# Patient Record
Sex: FEMALE | Race: WHITE | Employment: OTHER | URBAN - METROPOLITAN AREA
[De-identification: names, ages, dates, MRNs, and addresses within clinical notes are randomized per-mention and may not be internally consistent; named-entity substitution may affect disease eponyms.]

---

## 2017-12-20 ENCOUNTER — HOSPITAL ENCOUNTER (OUTPATIENT)
Dept: LAB | Age: 27
Discharge: HOME | End: 2017-12-20

## 2017-12-22 LAB — SPECIMEN SENT TO LAB,INSX: NORMAL

## 2019-04-10 ENCOUNTER — OFFICE VISIT (OUTPATIENT)
Dept: PRIMARY CARE CLINIC | Age: 29
End: 2019-04-10

## 2019-04-10 VITALS
WEIGHT: 165.2 LBS | HEIGHT: 65 IN | OXYGEN SATURATION: 97 % | RESPIRATION RATE: 16 BRPM | TEMPERATURE: 98 F | SYSTOLIC BLOOD PRESSURE: 120 MMHG | BODY MASS INDEX: 27.52 KG/M2 | HEART RATE: 92 BPM | DIASTOLIC BLOOD PRESSURE: 82 MMHG

## 2019-04-10 DIAGNOSIS — Z00.00 ROUTINE PHYSICAL EXAMINATION: Primary | ICD-10-CM

## 2019-04-10 DIAGNOSIS — R79.89 LOW VITAMIN D LEVEL: ICD-10-CM

## 2019-04-10 DIAGNOSIS — Z83.3 FAMILY HISTORY OF DIABETES MELLITUS: ICD-10-CM

## 2019-04-10 NOTE — PROGRESS NOTES
Chief Complaint   Patient presents with   [address]     [facility] and she has a yeast infection and place on vaginia and given doxy and states that she has switched to old birth controal

## 2019-04-10 NOTE — PROGRESS NOTES
[facility]   Dea [address], [address] [facility]  P: [phone]  F: [phone]      Chief Complaint   Patient presents with   [address]     [facility] and she has a yeast infection and place on vaginia and given doxy and states that she has switched to old birth controal        Steven Soares is a 33 y.o. female who presents to clinic for [address] ([facility] and she has a yeast infection and place on vaginia and given doxy and states that she has switched to old birth controal ). HPI:    The patient presents to [facility]- previously with [facility]. Overall healthy, no complaints besides stated issues below. Takes daily Zyrtec and OCP. She went to [facility] earlier this week-  irritation to labia for several weeks. She was placed on doxycycline course and given ketoconozole cream for yeast under her breasts    Chronic dermatitis to face- she has tried steroid creams, Cerave face wash, Dove soaps with some relief. Taking a daily Zyrtec as well. Preventive health:  UTD. Normal Pap last 2 yrs ago. She has an OBGYN set up for appointment in 6 months. Unsure of last tetanus. She is , is a  of pets mostly. No alcohol or smoking. Does use CBD oil for sleep and anxiety. There are no active problems to display for this patient. History reviewed. No pertinent past medical history.   Past Surgical History:   Procedure Laterality Date    HX CHOLECYSTECTOMY      2009     Social History     Socioeconomic History    Marital status: SINGLE     Spouse name: Not on file    Number of children: Not on file    Years of education: Not on file    Highest education level: Not on file   Occupational History    Not on file   Social Needs    Financial resource strain: Not on file    Food insecurity:     Worry: Not on file     Inability: Not on file   [employer] needs:     Medical: Not on file     Non-medical: Not on file   Tobacco Use    Smoking status: Never Smoker    Smokeless tobacco: Never Used   Substance and Sexual Activity    Alcohol use: Not Currently    Drug use: Not Currently    Sexual activity: Yes     Partners: Male   Lifestyle    Physical activity:     Days per week: Not on file     Minutes per session: Not on file    Stress: Not on file   Relationships    Social connections:     Talks on phone: Not on file     Gets together: Not on file     Attends Holiness service: Not on file     Active member of club or organization: Not on file     Attends meetings of clubs or organizations: Not on file     Relationship status: Not on file    Intimate partner violence:     Fear of current or ex partner: Not on file     Emotionally abused: Not on file     Physically abused: Not on file     Forced sexual activity: Not on file   Other Topics Concern    Not on file   Social History Narrative    Not on file     History reviewed. No pertinent family history. Allergies   Allergen Reactions    Dilaudid [Hydromorphone] Hives     And hallucinations. The medications were reviewed and updated in the medical record. The past medical history, past surgical history, and family history were reviewed and updated in the medical record. REVIEW OF SYSTEMS   Review of Systems   Constitutional: Negative for malaise/fatigue and weight loss. HENT: Negative for congestion. Eyes: Negative for blurred vision and pain. Respiratory: Negative for cough and shortness of breath. Cardiovascular: Negative for chest pain and palpitations. Gastrointestinal: Negative for abdominal pain and heartburn. Genitourinary: Negative for frequency and urgency. Musculoskeletal: Negative for joint pain and myalgias. Neurological: Negative for dizziness, tingling, sensory change, weakness and headaches.    Psychiatric/Behavioral: Negative for depression, memory loss and substance abuse. The patient does not have insomnia. PHYSICAL EXAM     Visit Vitals  /82 (BP 1 Location: Left arm, BP Patient Position: Sitting)   Pulse 92   Temp 98 °F (36.7 °C) (Oral)   Resp 16   Ht 5' 5\" (1.651 m)   Wt 165 lb 3.2 oz (74.9 kg)   SpO2 97%   BMI 27.49 kg/m²       Physical Exam   Constitutional: She is oriented to person, place, and time and well-developed, well-nourished, and in no distress. Vital signs are normal.   BMI 27   HENT:   Head: Normocephalic and atraumatic. Right Ear: Hearing, tympanic membrane, external ear and ear canal normal.   Left Ear: Hearing, tympanic membrane, external ear and ear canal normal.   Nose: Nose normal.   Mouth/Throat: Uvula is midline, oropharynx is clear and moist and mucous membranes are normal.   Eyes: Right eye visual fields normal and left eye visual fields normal. Pupils are equal, round, and reactive to light. Conjunctivae, EOM and lids are normal.   Fundoscopic exam:       The right eye shows no AV nicking. The left eye shows no AV nicking.   + Glasses   Neck: Trachea normal. No thyromegaly present. Cardiovascular: S1 normal, S2 normal and normal heart sounds. Exam reveals no gallop and no friction rub. No murmur heard. Pulmonary/Chest: Effort normal and breath sounds normal.   Abdominal: Soft. Normal appearance. There is no hepatosplenomegaly. There is no tenderness. Musculoskeletal: Normal range of motion. Neurological: She is alert and oriented to person, place, and time. She has normal reflexes. Gait normal.   Reflex Scores:       Patellar reflexes are 2+ on the right side and 2+ on the left side. Skin: Skin is warm, dry and intact. Several tattoos. Reports dermatitis to face- nothing found on exam.    Psychiatric: Mood, affect and judgment normal.     ASSESSMENT/ PLAN   Diagnoses and all orders for this visit:    1.  Routine physical examination  -     CBC W/O DIFF  -     METABOLIC PANEL, COMPREHENSIVE  -     LIPID PANEL  -     VITAMIN D, 25 HYDROXY  -     TSH 3RD GENERATION    2. Low vitamin D level  -     VITAMIN D, 25 HYDROXY    3. Family history of diabetes mellitus  -     HEMOGLOBIN A1C WITH EAG      Please sign release of records from [facility]. Reviewed age appropriate safety and screening guidelines, as well as maintaining a healthy diet and exercise 150 minutes or more weekly. Wear SPF 15 or greater sunscreen and please wear seatbelt. Discussed safe sex practices. Patient with no further questions today. Disclaimer:  Advised patient to call back or return to office if symptoms worsen/change/persist.  Discussed expected course/resolution/complications of diagnosis in detail with patient.     Medication risks/benefits/alternatives discussed with patient. Patient was given an after visit summary which includes diagnoses, current medications, & vitals.      Discussed patient instructions and advised to read to all patient instructions regarding care.      Patient expressed understanding with the diagnosis and plan. This note will not be viewable in [address] Carolyn Hastings, DIVYA  4/10/2019        (This document has been electronically signed)  Discussed the patient's BMI with her. The BMI follow up plan is as follows:     BMI discussed with patient. Discussed lifestyle changes, daily physical activity, and advised 150 minutes of exercise weekly. Discussed healthy diet choices and limiting fried, fatty foods, fast foods, processed foods, sugar-sweetened beverages/soda, and added sugars. Increase fruits, vegetables, low-fat dairy products, lean proteins, and whole grains. An After Visit Summary was printed and given to the patient.

## 2019-04-10 NOTE — PATIENT INSTRUCTIONS
Body Mass Index: Care Instructions  Your Care Instructions    Body mass index (BMI) can help you see if your weight is raising your risk for health problems. It uses a formula to compare how much you weigh with how tall you are. · A BMI lower than 18.5 is considered underweight. · A BMI between 18.5 and 24.9 is considered healthy. · A BMI between 25 and 29.9 is considered overweight. A BMI of 30 or higher is considered obese. If your BMI is in the normal range, it means that you have a lower risk for weight-related health problems. If your BMI is in the overweight or obese range, you may be at increased risk for weight-related health problems, such as high blood pressure, heart disease, stroke, arthritis or joint pain, and diabetes. If your BMI is in the underweight range, you may be at increased risk for health problems such as fatigue, lower protection (immunity) against illness, muscle loss, bone loss, hair loss, and hormone problems. BMI is just one measure of your risk for weight-related health problems. You may be at higher risk for health problems if you are not active, you eat an unhealthy diet, or you drink too much alcohol or use tobacco products. Follow-up care is a key part of your treatment and safety. Be sure to make and go to all appointments, and call your doctor if you are having problems. It's also a good idea to know your test results and keep a list of the medicines you take. How can you care for yourself at home? · Practice healthy eating habits. This includes eating plenty of fruits, vegetables, whole grains, lean protein, and low-fat dairy. · If your doctor recommends it, get more exercise. Walking is a good choice. Bit by bit, increase the amount you walk every day. Try for at least 30 minutes on most days of the week. · Do not smoke. Smoking can increase your risk for health problems. If you need help quitting, talk to your doctor about stop-smoking programs and medicines. These can increase your chances of quitting for good. · Limit alcohol to 2 drinks a day for men and 1 drink a day for women. Too much alcohol can cause health problems. If you have a BMI higher than 25  · Your doctor may do other tests to check your risk for weight-related health problems. This may include measuring the distance around your waist. A waist measurement of more than 40 inches in men or 35 inches in women can increase the risk of weight-related health problems. · Talk with your doctor about steps you can take to stay healthy or improve your health. You may need to make lifestyle changes to lose weight and stay healthy, such as changing your diet and getting regular exercise. If you have a BMI lower than 18.5  · Your doctor may do other tests to check your risk for health problems. · Talk with your doctor about steps you can take to stay healthy or improve your health. You may need to make lifestyle changes to gain or maintain weight and stay healthy, such as getting more healthy foods in your diet and doing exercises to build muscle. Where can you learn more? Go to http://pat-sebastián.info/. Enter S176 in the search box to learn more about \"Body Mass Index: Care Instructions. \"  Current as of: October 13, 2016  Content Version: 11.4  © 6690-2978 [employer]. Care instructions adapted under license by [employer] (which disclaims liability or warranty for this information). If you have questions about a medical condition or this instruction, always ask your healthcare professional. [address] any warranty or liability for your use of this information.

## 2019-04-11 LAB
25(OH)D3+25(OH)D2 SERPL-MCNC: 27.1 NG/ML (ref 30–100)
ALBUMIN SERPL-MCNC: 4.2 G/DL (ref 3.5–5.5)
ALBUMIN/GLOB SERPL: 1.6 {RATIO} (ref 1.2–2.2)
ALP SERPL-CCNC: 69 IU/L (ref 39–117)
ALT SERPL-CCNC: 17 IU/L (ref 0–32)
AST SERPL-CCNC: 17 IU/L (ref 0–40)
BILIRUB SERPL-MCNC: 0.6 MG/DL (ref 0–1.2)
BUN SERPL-MCNC: 12 MG/DL (ref 6–20)
BUN/CREAT SERPL: 14 (ref 9–23)
CALCIUM SERPL-MCNC: 9.4 MG/DL (ref 8.7–10.2)
CHLORIDE SERPL-SCNC: 107 MMOL/L (ref 96–106)
CHOLEST SERPL-MCNC: 184 MG/DL (ref 100–199)
CO2 SERPL-SCNC: 18 MMOL/L (ref 20–29)
CREAT SERPL-MCNC: 0.86 MG/DL (ref 0.57–1)
ERYTHROCYTE [DISTWIDTH] IN BLOOD BY AUTOMATED COUNT: 13 % (ref 12.3–15.4)
EST. AVERAGE GLUCOSE BLD GHB EST-MCNC: 105 MG/DL
GLOBULIN SER CALC-MCNC: 2.7 G/DL (ref 1.5–4.5)
GLUCOSE SERPL-MCNC: 88 MG/DL (ref 65–99)
HBA1C MFR BLD: 5.3 % (ref 4.8–5.6)
HCT VFR BLD AUTO: 40.5 % (ref 34–46.6)
HDLC SERPL-MCNC: 46 MG/DL
HGB BLD-MCNC: 13.3 G/DL (ref 11.1–15.9)
LDLC SERPL CALC-MCNC: 108 MG/DL (ref 0–99)
MCH RBC QN AUTO: 29.2 PG (ref 26.6–33)
MCHC RBC AUTO-ENTMCNC: 32.8 G/DL (ref 31.5–35.7)
MCV RBC AUTO: 89 FL (ref 79–97)
PLATELET # BLD AUTO: 208 X10E3/UL (ref 150–379)
POTASSIUM SERPL-SCNC: 4.3 MMOL/L (ref 3.5–5.2)
PROT SERPL-MCNC: 6.9 G/DL (ref 6–8.5)
RBC # BLD AUTO: 4.55 X10E6/UL (ref 3.77–5.28)
SODIUM SERPL-SCNC: 140 MMOL/L (ref 134–144)
TRIGL SERPL-MCNC: 151 MG/DL (ref 0–149)
TSH SERPL DL<=0.005 MIU/L-ACNC: 2.64 UIU/ML (ref 0.45–4.5)
VLDLC SERPL CALC-MCNC: 30 MG/DL (ref 5–40)
WBC # BLD AUTO: 5 X10E3/UL (ref 3.4–10.8)

## 2019-04-12 NOTE — PROGRESS NOTES
Please call- let her know triglyceride and LDL cholesterol are slightly up and Vit D is low. Have her work on exercise and diet for cholesterol and for low vitamin D,  I recommend over the counter 1000IU Vitamin D daily. I do recommend a calcium supplement of 47470ua a day and 15 minutes of sunlight daily for optimal bone health and decreased fracture risk.  No need for follow-up just annual exam.

## 2019-05-20 ENCOUNTER — OFFICE VISIT (OUTPATIENT)
Dept: PRIMARY CARE CLINIC | Age: 29
End: 2019-05-20

## 2019-05-20 VITALS
WEIGHT: 161.2 LBS | BODY MASS INDEX: 26.86 KG/M2 | SYSTOLIC BLOOD PRESSURE: 138 MMHG | OXYGEN SATURATION: 99 % | TEMPERATURE: 98.1 F | HEIGHT: 65 IN | RESPIRATION RATE: 16 BRPM | DIASTOLIC BLOOD PRESSURE: 92 MMHG | HEART RATE: 86 BPM

## 2019-05-20 DIAGNOSIS — R21 MALAR RASH: Primary | ICD-10-CM

## 2019-05-20 NOTE — PROGRESS NOTES
Chief Complaint   Patient presents with   Philip Tip has multiple symptoms and would like to be checked for lupus. jointpain, mouth sores, cough and fatigue and butterfly rash on face.

## 2019-05-20 NOTE — PROGRESS NOTES
[facility]   [address], [address] [facility]  P: [phone]  F: [phone]      Chief Complaint   Patient presents with   Wendy Cedeño has multiple symptoms and would like to be checked for lupus. jointpain, mouth sores, cough and fatigue and butterfly rash on face. Vince Crane is a 30 y.o. female who presents to clinic for Other (ninot has multiple symptoms and would like to be checked for lupus. jointpain, mouth sores, cough and fatigue and butterfly rash on face. ). HPI:    Maria G is a 81-year-old female who presents today with request to check lab work for lupus. She is experiencing episodic malar rash, which occurs at random, but is not currently bothering her. She also went to the dentist recently and was told she may have oral ulcers, but went to a second dentist who denied seeing anything abnormal on dental exam.  She endorses soreness to multiple joints including her bilateral knees and elbows, and her TMJ. She is unsure of her father's medical history as she is not in touch with him but is wondering if he has a family history for lupus. There are no active problems to display for this patient.      Past Medical History:   Diagnosis Date    Asthma     Ovarian cyst      Past Surgical History:   Procedure Laterality Date    HX CHOLECYSTECTOMY      HX CHOLECYSTECTOMY      2009     Social History     Socioeconomic History    Marital status: SINGLE     Spouse name: Not on file    Number of children: Not on file    Years of education: Not on file    Highest education level: Not on file   Occupational History    Not on file   Social Needs    Financial resource strain: Not on file    Food insecurity:     Worry: Not on file     Inability: Not on file    Transportation needs:     Medical: Not on file     Non-medical: Not on file   Tobacco Use    Smoking status: Never Smoker    Smokeless tobacco: Never Used   Substance and Sexual Activity    Alcohol use: Not Currently     Comment: socially    Drug use: Not Currently    Sexual activity: Yes     Partners: Male   Lifestyle    Physical activity:     Days per week: Not on file     Minutes per session: Not on file    Stress: Not on file   Relationships    Social connections:     Talks on phone: Not on file     Gets together: Not on file     Attends Judaism service: Not on file     Active member of club or organization: Not on file     Attends meetings of clubs or organizations: Not on file     Relationship status: Not on file    Intimate partner violence:     Fear of current or ex partner: Not on file     Emotionally abused: Not on file     Physically abused: Not on file     Forced sexual activity: Not on file   Other Topics Concern    Not on file   Social History Narrative    ** Merged History Encounter **          History reviewed. No pertinent family history. Allergies   Allergen Reactions    Latex Swelling    Dilaudid [Hydromorphone (Bulk)] Other (comments)     hallucinations    Dilaudid [Hydromorphone] Hives     And hallucinations.  Nystatin-Emollient Combo No.54 Rash       Current Outpatient Medications   Medication Sig Dispense Refill    albuterol (PROVENTIL VENTOLIN) 2.5 mg /3 mL (0.083 %) nebulizer solution 2.5 mg by Nebulization route once.  norgestimate-ethinyl estradiol (TRINESSA, 28,) 0.18/0.215/0.25 mg-35 mcg (28) tablet Take 1 Tab by mouth daily.  ibuprofen (MOTRIN) 600 mg tablet Take 1 Tab by mouth every six (6) hours as needed for Pain. 20 Tab 0           The medications were reviewed and updated in the medical record. The past medical history, past surgical history, and family history were reviewed and updated in the medical record. REVIEW OF SYSTEMS   Review of Systems   Constitutional: Negative for malaise/fatigue. HENT: Negative for congestion. Eyes: Negative for blurred vision and pain.    Respiratory: Negative for cough and shortness of breath. Cardiovascular: Negative for chest pain and palpitations. Gastrointestinal: Negative for abdominal pain and heartburn. Genitourinary: Negative for frequency and urgency. Musculoskeletal: Positive for myalgias. Negative for joint pain. Neurological: Negative for dizziness, tingling, sensory change, weakness and headaches. Psychiatric/Behavioral: Negative for depression, memory loss and substance abuse. The patient is nervous/anxious. PHYSICAL EXAM     Visit Vitals  BP (!) 138/92 (BP 1 Location: Left arm, BP Patient Position: Sitting)   Pulse 86   Temp 98.1 °F (36.7 °C) (Oral)   Resp 16   Ht 5' 5\" (1.651 m)   Wt 161 lb 3.2 oz (73.1 kg)   LMP 05/17/2019   SpO2 99%   BMI 26.83 kg/m²     Physical Exam   Constitutional: She is oriented to person, place, and time and well-developed, well-nourished, and in no distress. HENT:   Head: Normocephalic and atraumatic. Right Ear: External ear normal.   Left Ear: External ear normal.   Mouth/Throat: Oropharynx is clear and moist and mucous membranes are normal.   No oral ulcers present. No malar rash present   Cardiovascular: Normal rate, regular rhythm and normal heart sounds. Pulmonary/Chest: Effort normal and breath sounds normal.   Musculoskeletal: She exhibits no edema. Right elbow: She exhibits normal range of motion, no swelling and no deformity. Left elbow: She exhibits normal range of motion, no swelling and no deformity. Right knee: She exhibits normal range of motion, no swelling, no deformity and no erythema. Left knee: She exhibits normal range of motion, no swelling, no deformity and no erythema. Neurological: She is alert and oriented to person, place, and time. Gait normal.   Skin: Skin is warm and dry. Psychiatric: Affect and judgment normal.   Nursing note and vitals reviewed. ASSESSMENT/ PLAN   Diagnoses and all orders for this visit:    1.  Malar rash  -     BETTY W/REFLEX  -     SED RATE (ESR)  -     CRP, HIGH SENSITIVITY    2. FH: lupus  -     BETTY W/REFLEX  -     SED RATE (ESR)  -     CRP, HIGH SENSITIVITY      Checking above labs per request of patient. Physical exam reveals no oral ulcers or malar rash, no gross deformities or swelling of joints. Patient endorses some mild stiffness to elbows, knees, and TMJ joint which she thinks may be related to increased stress. Encouraged to try a short NSAID course naproxen 500 mg twice daily for 3 to 5 days. Disclaimer:  Advised patient to call back or return to office if symptoms worsen/change/persist.  Discussed expected course/resolution/complications of diagnosis in detail with patient.     Medication risks/benefits/alternatives discussed with patient. Patient was given an after visit summary which includes diagnoses, current medications, & vitals.      Discussed patient instructions and advised to read to all patient instructions regarding care.      Patient expressed understanding with the diagnosis and plan. This note will not be viewable in [address]         Jose Miguel Whelan NP  5/20/2019        (This document has been electronically signed)

## 2019-05-21 DIAGNOSIS — M25.50 ARTHRALGIA, UNSPECIFIED JOINT: ICD-10-CM

## 2019-05-21 DIAGNOSIS — R21 MALAR RASH: Primary | ICD-10-CM

## 2019-05-21 DIAGNOSIS — R79.82 ELEVATED C-REACTIVE PROTEIN (CRP): ICD-10-CM

## 2019-05-21 LAB
ANA SER QL: NEGATIVE
CRP SERPL HS-MCNC: 41.1 MG/L (ref 0–3)
ERYTHROCYTE [SEDIMENTATION RATE] IN BLOOD BY WESTERGREN METHOD: 10 MM/HR (ref 0–32)

## 2019-06-17 ENCOUNTER — OFFICE VISIT (OUTPATIENT)
Dept: PRIMARY CARE CLINIC | Age: 29
End: 2019-06-17

## 2019-06-17 VITALS
HEART RATE: 91 BPM | DIASTOLIC BLOOD PRESSURE: 85 MMHG | SYSTOLIC BLOOD PRESSURE: 118 MMHG | OXYGEN SATURATION: 97 % | HEIGHT: 65 IN | BODY MASS INDEX: 26.52 KG/M2 | RESPIRATION RATE: 17 BRPM | TEMPERATURE: 98.4 F | WEIGHT: 159.2 LBS

## 2019-06-17 DIAGNOSIS — F12.90 USES MARIJUANA: ICD-10-CM

## 2019-06-17 DIAGNOSIS — J45.909 MODERATE ASTHMA WITHOUT COMPLICATION, UNSPECIFIED WHETHER PERSISTENT: ICD-10-CM

## 2019-06-17 DIAGNOSIS — F41.9 ANXIETY: Primary | ICD-10-CM

## 2019-06-17 DIAGNOSIS — G47.00 INSOMNIA, UNSPECIFIED TYPE: ICD-10-CM

## 2019-06-17 RX ORDER — ALBUTEROL SULFATE 0.83 MG/ML
2.5 SOLUTION RESPIRATORY (INHALATION)
Qty: 24 EACH | Refills: 1 | Status: SHIPPED | OUTPATIENT
Start: 2019-06-17 | End: 2020-03-02 | Stop reason: SDUPTHER

## 2019-06-17 RX ORDER — BUSPIRONE HYDROCHLORIDE 15 MG/1
7.5 TABLET ORAL 2 TIMES DAILY
Qty: 30 TAB | Refills: 1 | Status: SHIPPED | OUTPATIENT
Start: 2019-06-17 | End: 2019-08-26 | Stop reason: ALTCHOICE

## 2019-06-17 RX ORDER — ALBUTEROL SULFATE 90 UG/1
1 AEROSOL, METERED RESPIRATORY (INHALATION)
Qty: 1 INHALER | Refills: 0 | Status: SHIPPED | OUTPATIENT
Start: 2019-06-17 | End: 2020-03-17 | Stop reason: SDUPTHER

## 2019-06-17 RX ORDER — TRAZODONE HYDROCHLORIDE 50 MG/1
50 TABLET ORAL
Qty: 60 TAB | Refills: 1 | Status: SHIPPED | OUTPATIENT
Start: 2019-06-17 | End: 2019-08-26 | Stop reason: ALTCHOICE

## 2019-06-17 NOTE — PROGRESS NOTES
Chief Complaint   Patient presents with    Follow-up     follow up on labs and states that she is having some anxiety issues and would like to discuss with provider

## 2019-06-17 NOTE — PROGRESS NOTES
[facility]   Dea Briceno 66[address], [address] [facility]  P: [phone]  F: [phone]      Chief Complaint   Patient presents with    Follow-up     follow up on labs and states that she is having some anxiety issues and would like to discuss with provider       Elie Leal is a 30 y.o. female who presents to clinic for Follow-up (follow up on labs and states that she is having some anxiety issues and would like to discuss with provider). HPI:    Maria G is a 27-year-old who presents today for lab follow-up after having elevated CRP of 41.1 on her previous visit May 20, 2019. She presented at that time for concern of lupus, but had a negative BETTY and sed rate at that time. She endorses papular rash to the lower portion of her bottom lip. She denies a history of HSV and notes the bumps appear more like acne than a cold sore. She also wishes to discuss her anxiety, which is worsened over the last 1 to 2 months. She states she has previously tried both Zoloft and Wellbutrin, with limited improvement in her anxiety. She also previously took Klonopin for sleep. She states her anxiety manifests as a feeling of her chest is tight, shortness of breath, and mind racing at night. She states she is previously been told by a psychiatrist that she has bipolar, but declined taking the medication prescribed to her at that time. She states currently she is smoking marijuana, including both THC and CBD products. She finds that the marijuana helps with her anxiety, but makes her both hungry and sleepy. She is interested today in trying medication for her anxiety also something to help her with sleep. She denies a current counselor or psychiatrist, it is more agreeable today of setting up counseling as she did not previously find psychiatry helpful. She denies SI or HI today. There are no active problems to display for this patient.     Past Medical History:   Diagnosis Date  Asthma     Ovarian cyst      Past Surgical History:   Procedure Laterality Date    HX CHOLECYSTECTOMY      HX CHOLECYSTECTOMY      2009     Social History     Socioeconomic History    Marital status: SINGLE     Spouse name: Not on file    Number of children: Not on file    Years of education: Not on file    Highest education level: Not on file   Occupational History    Not on file   Social Needs    Financial resource strain: Not on file    Food insecurity:     Worry: Not on file     Inability: Not on file    Transportation needs:     Medical: Not on file     Non-medical: Not on file   Tobacco Use    Smoking status: Never Smoker    Smokeless tobacco: Never Used   Substance and Sexual Activity    Alcohol use: Not Currently     Comment: socially    Drug use: Not Currently    Sexual activity: Yes     Partners: Male   Lifestyle    Physical activity:     Days per week: Not on file     Minutes per session: Not on file    Stress: Not on file   Relationships    Social connections:     Talks on phone: Not on file     Gets together: Not on file     Attends Jainism service: Not on file     Active member of club or organization: Not on file     Attends meetings of clubs or organizations: Not on file     Relationship status: Not on file    Intimate partner violence:     Fear of current or ex partner: Not on file     Emotionally abused: Not on file     Physically abused: Not on file     Forced sexual activity: Not on file   Other Topics Concern    Not on file   Social History Narrative    ** Merged History Encounter **          History reviewed. No pertinent family history. Allergies   Allergen Reactions    Latex Swelling    Dilaudid [Hydromorphone (Bulk)] Other (comments)     hallucinations    Dilaudid [Hydromorphone] Hives     And hallucinations.      Nystatin-Emollient Combo No.54 Rash       Current Outpatient Medications   Medication Sig Dispense Refill    albuterol (PROVENTIL VENTOLIN) 2.5 mg /3 mL (0.083 %) nebulizer solution 3 mL by Nebulization route two (2) times daily as needed for Wheezing. 24 Each 1    albuterol (PROVENTIL HFA, VENTOLIN HFA, PROAIR HFA) 90 mcg/actuation inhaler Take 1 Puff by inhalation every six (6) hours as needed for Wheezing. 1 Inhaler 0    traZODone (DESYREL) 50 mg tablet Take 1 Tab by mouth nightly. 60 Tab 1    busPIRone (BUSPAR) 15 mg tablet Take 0.5 Tabs by mouth two (2) times a day. 30 Tab 1    norgestimate-ethinyl estradiol (TRINESSA, 28,) 0.18/0.215/0.25 mg-35 mcg (28) tablet Take 1 Tab by mouth daily.  ibuprofen (MOTRIN) 600 mg tablet Take 1 Tab by mouth every six (6) hours as needed for Pain. 20 Tab 0           The medications were reviewed and updated in the medical record. The past medical history, past surgical history, and family history were reviewed and updated in the medical record. REVIEW OF SYSTEMS   Review of Systems   Constitutional: Negative for malaise/fatigue. HENT: Negative for congestion. Eyes: Negative for blurred vision and pain. Respiratory: Negative for cough and shortness of breath. Cardiovascular: Negative for chest pain and palpitations. Gastrointestinal: Negative for abdominal pain and heartburn. Genitourinary: Negative for frequency and urgency. Musculoskeletal: Negative for joint pain and myalgias. Neurological: Negative for dizziness, tingling, sensory change, weakness and headaches. Psychiatric/Behavioral: Negative for depression, memory loss and substance abuse. The patient is nervous/anxious and has insomnia. PHYSICAL EXAM     Visit Vitals  /85 (BP 1 Location: Left arm, BP Patient Position: Sitting)   Pulse 91   Temp 98.4 °F (36.9 °C) (Oral)   Resp 17   Ht 5' 5\" (1.651 m)   Wt 159 lb 3.2 oz (72.2 kg)   LMP 06/14/2019   SpO2 97%   BMI 26.49 kg/m²       Physical Exam   Constitutional: She is oriented to person, place, and time and well-developed, well-nourished, and in no distress.    HENT: Head: Normocephalic and atraumatic. Right Ear: External ear normal.   Left Ear: External ear normal.   Cardiovascular: Normal rate, regular rhythm and normal heart sounds. Pulmonary/Chest: Effort normal and breath sounds normal.   Musculoskeletal: Normal range of motion. She exhibits no edema. Neurological: She is alert and oriented to person, place, and time. Gait normal.   Skin: Skin is warm and dry. Psychiatric: Affect and judgment normal.   Nursing note and vitals reviewed. ASSESSMENT/ PLAN   Diagnoses and all orders for this visit:    1. Anxiety  -     busPIRone (BUSPAR) 15 mg tablet; Take 0.5 Tabs by mouth two (2) times a day. -Provided multiple resources today for counseling and encouraged the patient to set up an appointment at her convenience. -Deferred SSRI today due to concern with worsening suicidality on Zoloft previously, also with question as to whether patient has bipolar disorder.   -Encouraged her to revisit psychiatry even though she had a negative experience in the past    2. Insomnia, unspecified type        -     traZODone (DESYREL) 50 mg tablet; Take 1 Tab by mouth nightly. 3. Moderate asthma without complication, unspecified whether persistent  -     albuterol (PROVENTIL VENTOLIN) 2.5 mg /3 mL (0.083 %) nebulizer solution; 3 mL by Nebulization route two (2) times daily as needed for Wheezing.  -     albuterol (PROVENTIL HFA, VENTOLIN HFA, PROAIR HFA) 90 mcg/actuation inhaler; Take 1 Puff by inhalation every six (6) hours as needed for Wheezing. 4. Uses marijuana  -Limited research on CBD products, cautioned on worsening of anxiety/paranoia with marijuana and negative impact on her asthma    Disclaimer:  Advised patient to call back or return to office if symptoms worsen/change/persist.  Discussed expected course/resolution/complications of diagnosis in detail with patient.     Medication risks/benefits/alternatives discussed with patient.   Patient was given an after visit summary which includes diagnoses, current medications, & vitals.      Discussed patient instructions and advised to read to all patient instructions regarding care.      Patient expressed understanding with the diagnosis and plan. This note will not be viewable in [address]         Rhiannon Kaiser NP  6/17/2019        (This document has been electronically signed)

## 2019-06-19 LAB
CCP IGA+IGG SERPL IA-ACNC: 13 UNITS (ref 0–19)
CRP SERPL HS-MCNC: 5.59 MG/L (ref 0–3)
RHEUMATOID FACT SERPL-ACNC: <10 IU/ML (ref 0–13.9)

## 2019-08-22 ENCOUNTER — HOSPITAL ENCOUNTER (EMERGENCY)
Age: 29
Discharge: HOME | End: 2019-08-23
Attending: EMERGENCY MEDICINE
Payer: MEDICAID

## 2019-08-22 DIAGNOSIS — K21.9 CHEST PAIN DUE TO GERD: Primary | ICD-10-CM

## 2019-08-22 DIAGNOSIS — R11.0 NAUSEA WITHOUT VOMITING: ICD-10-CM

## 2019-08-22 DIAGNOSIS — R07.9 CHEST PAIN DUE TO GERD: Primary | ICD-10-CM

## 2019-08-22 PROCEDURE — 99284 EMERGENCY DEPT VISIT MOD MDM: CPT

## 2019-08-22 PROCEDURE — 96360 HYDRATION IV INFUSION INIT: CPT

## 2019-08-22 PROCEDURE — 96374 THER/PROPH/DIAG INJ IV PUSH: CPT

## 2019-08-23 ENCOUNTER — APPOINTMENT (OUTPATIENT)
Dept: GENERAL RADIOLOGY | Age: 29
End: 2019-08-23
Attending: FAMILY MEDICINE
Payer: MEDICAID

## 2019-08-23 VITALS
SYSTOLIC BLOOD PRESSURE: 126 MMHG | WEIGHT: 160 LBS | OXYGEN SATURATION: 99 % | HEIGHT: 65 IN | TEMPERATURE: 98.3 F | HEART RATE: 79 BPM | BODY MASS INDEX: 26.66 KG/M2 | RESPIRATION RATE: 16 BRPM | DIASTOLIC BLOOD PRESSURE: 86 MMHG

## 2019-08-23 LAB
ALBUMIN SERPL-MCNC: 3.5 G/DL (ref 3.5–5)
ALBUMIN/GLOB SERPL: 0.9 {RATIO} (ref 1.1–2.2)
ALP SERPL-CCNC: 61 U/L (ref 45–117)
ALT SERPL-CCNC: 23 U/L (ref 12–78)
AMPHET UR QL SCN: NEGATIVE
ANION GAP SERPL CALC-SCNC: 7 MMOL/L (ref 5–15)
APPEARANCE UR: CLEAR
AST SERPL-CCNC: 18 U/L (ref 15–37)
ATRIAL RATE: 92 BPM
BACTERIA URNS QL MICRO: NEGATIVE /HPF
BARBITURATES UR QL SCN: NEGATIVE
BASOPHILS # BLD: 0.1 K/UL (ref 0–0.1)
BASOPHILS NFR BLD: 1 % (ref 0–1)
BENZODIAZ UR QL: NEGATIVE
BILIRUB SERPL-MCNC: 0.3 MG/DL (ref 0.2–1)
BILIRUB UR QL: NEGATIVE
BUN SERPL-MCNC: 9 MG/DL (ref 6–20)
BUN/CREAT SERPL: 11 (ref 12–20)
CALCIUM SERPL-MCNC: 8.8 MG/DL (ref 8.5–10.1)
CALCULATED P AXIS, ECG09: 32 DEGREES
CALCULATED R AXIS, ECG10: 60 DEGREES
CALCULATED T AXIS, ECG11: 41 DEGREES
CANNABINOIDS UR QL SCN: NEGATIVE
CHLORIDE SERPL-SCNC: 112 MMOL/L (ref 97–108)
CO2 SERPL-SCNC: 23 MMOL/L (ref 21–32)
COCAINE UR QL SCN: NEGATIVE
COLOR UR: ABNORMAL
COMMENT, HOLDF: NORMAL
COMMENT, HOLDF: NORMAL
CREAT SERPL-MCNC: 0.82 MG/DL (ref 0.55–1.02)
D DIMER PPP FEU-MCNC: 0.54 MG/L FEU (ref 0–0.65)
DIAGNOSIS, 93000: NORMAL
DIFFERENTIAL METHOD BLD: ABNORMAL
DRUG SCRN COMMENT,DRGCM: NORMAL
EOSINOPHIL # BLD: 0.1 K/UL (ref 0–0.4)
EOSINOPHIL NFR BLD: 2 % (ref 0–7)
EPITH CASTS URNS QL MICRO: ABNORMAL /LPF
ERYTHROCYTE [DISTWIDTH] IN BLOOD BY AUTOMATED COUNT: 11.9 % (ref 11.5–14.5)
ETHANOL SERPL-MCNC: <10 MG/DL
GLOBULIN SER CALC-MCNC: 3.7 G/DL (ref 2–4)
GLUCOSE SERPL-MCNC: 147 MG/DL (ref 65–100)
GLUCOSE UR STRIP.AUTO-MCNC: NEGATIVE MG/DL
HCG UR QL: NEGATIVE
HCT VFR BLD AUTO: 38.9 % (ref 35–47)
HGB BLD-MCNC: 13 G/DL (ref 11.5–16)
HGB UR QL STRIP: NEGATIVE
IMM GRANULOCYTES # BLD AUTO: 0 K/UL (ref 0–0.04)
IMM GRANULOCYTES NFR BLD AUTO: 1 % (ref 0–0.5)
KETONES UR QL STRIP.AUTO: NEGATIVE MG/DL
LEUKOCYTE ESTERASE UR QL STRIP.AUTO: ABNORMAL
LYMPHOCYTES # BLD: 2.3 K/UL (ref 0.8–3.5)
LYMPHOCYTES NFR BLD: 35 % (ref 12–49)
MCH RBC QN AUTO: 29.9 PG (ref 26–34)
MCHC RBC AUTO-ENTMCNC: 33.4 G/DL (ref 30–36.5)
MCV RBC AUTO: 89.4 FL (ref 80–99)
METHADONE UR QL: NEGATIVE
MONOCYTES # BLD: 0.5 K/UL (ref 0–1)
MONOCYTES NFR BLD: 8 % (ref 5–13)
NEUTS SEG # BLD: 3.6 K/UL (ref 1.8–8)
NEUTS SEG NFR BLD: 53 % (ref 32–75)
NITRITE UR QL STRIP.AUTO: NEGATIVE
NRBC # BLD: 0 K/UL (ref 0–0.01)
NRBC BLD-RTO: 0 PER 100 WBC
OPIATES UR QL: NEGATIVE
P-R INTERVAL, ECG05: 162 MS
PCP UR QL: NEGATIVE
PH UR STRIP: 7.5 [PH] (ref 5–8)
PLATELET # BLD AUTO: 210 K/UL (ref 150–400)
PMV BLD AUTO: 11.3 FL (ref 8.9–12.9)
POTASSIUM SERPL-SCNC: 3.5 MMOL/L (ref 3.5–5.1)
PROT SERPL-MCNC: 7.2 G/DL (ref 6.4–8.2)
PROT UR STRIP-MCNC: NEGATIVE MG/DL
Q-T INTERVAL, ECG07: 360 MS
QRS DURATION, ECG06: 84 MS
QTC CALCULATION (BEZET), ECG08: 445 MS
RBC # BLD AUTO: 4.35 M/UL (ref 3.8–5.2)
RBC #/AREA URNS HPF: ABNORMAL /HPF (ref 0–5)
SAMPLES BEING HELD,HOLD: NORMAL
SAMPLES BEING HELD,HOLD: NORMAL
SODIUM SERPL-SCNC: 142 MMOL/L (ref 136–145)
SP GR UR REFRACTOMETRY: 1.01 (ref 1–1.03)
UROBILINOGEN UR QL STRIP.AUTO: 0.2 EU/DL (ref 0.2–1)
VENTRICULAR RATE, ECG03: 92 BPM
WBC # BLD AUTO: 6.5 K/UL (ref 3.6–11)
WBC URNS QL MICRO: ABNORMAL /HPF (ref 0–4)

## 2019-08-23 PROCEDURE — 36415 COLL VENOUS BLD VENIPUNCTURE: CPT

## 2019-08-23 PROCEDURE — 71046 X-RAY EXAM CHEST 2 VIEWS: CPT

## 2019-08-23 PROCEDURE — 85379 FIBRIN DEGRADATION QUANT: CPT

## 2019-08-23 PROCEDURE — 81025 URINE PREGNANCY TEST: CPT

## 2019-08-23 PROCEDURE — 74011250636 HC RX REV CODE- 250/636: Performed by: FAMILY MEDICINE

## 2019-08-23 PROCEDURE — 74011000250 HC RX REV CODE- 250: Performed by: FAMILY MEDICINE

## 2019-08-23 PROCEDURE — 74011250637 HC RX REV CODE- 250/637: Performed by: FAMILY MEDICINE

## 2019-08-23 PROCEDURE — 85025 COMPLETE CBC W/AUTO DIFF WBC: CPT

## 2019-08-23 PROCEDURE — 93005 ELECTROCARDIOGRAM TRACING: CPT

## 2019-08-23 PROCEDURE — 80053 COMPREHEN METABOLIC PANEL: CPT

## 2019-08-23 PROCEDURE — 81001 URINALYSIS AUTO W/SCOPE: CPT

## 2019-08-23 PROCEDURE — 80307 DRUG TEST PRSMV CHEM ANLYZR: CPT

## 2019-08-23 RX ORDER — ONDANSETRON 2 MG/ML
8 INJECTION INTRAMUSCULAR; INTRAVENOUS
Status: COMPLETED | OUTPATIENT
Start: 2019-08-23 | End: 2019-08-23

## 2019-08-23 RX ORDER — ONDANSETRON HYDROCHLORIDE 8 MG/1
8 TABLET, FILM COATED ORAL
Qty: 10 TAB | Refills: 0 | Status: SHIPPED | OUTPATIENT
Start: 2019-08-23 | End: 2021-03-03 | Stop reason: ALTCHOICE

## 2019-08-23 RX ORDER — RANITIDINE HCL 75 MG
75 TABLET ORAL
Qty: 10 TAB | Refills: 0 | Status: SHIPPED | OUTPATIENT
Start: 2019-08-23 | End: 2021-03-03 | Stop reason: ALTCHOICE

## 2019-08-23 RX ADMIN — SODIUM CHLORIDE 1000 ML: 900 INJECTION, SOLUTION INTRAVENOUS at 01:00

## 2019-08-23 RX ADMIN — ONDANSETRON 8 MG: 2 INJECTION INTRAMUSCULAR; INTRAVENOUS at 00:59

## 2019-08-23 RX ADMIN — LIDOCAINE HYDROCHLORIDE 40 ML: 20 SOLUTION ORAL; TOPICAL at 01:37

## 2019-08-23 NOTE — ED PROVIDER NOTES
30 y.o. female with past medical history significant for endometriosis, recreational drugs use, asthma, cholecyctectomy who presents from home with her boyfriend via private vehicle with chief complaint of midline chest pain since 7.30 pm. Patient reports 7/10 burning pain in mid chest accompanied by SOB and numbness in both legs below knees, took Tums and Albuterol (8.30pm) w/o improvement. Pain is constant, but is 6/10 now. At 10.30 pm started to have diffuse cramp-like abd pain, and decides to come to ED. Patient had a glass of wine before onset, but denies recent drug use. Sexually active with her boyfriend, uses OCP, missed few doses due to insurance issues. Patient denies HA, blurry vision, ataxia, slurred speech, pain in joints, recent trauma, fever, chills, URI, STI. There are no other acute medical concerns at this time. Social hx: single, no tobacco, alcohol occasionally (last drink today at 7 pm - wine), marijuana (denied recent use)  Significant FMHx: non-contributory  PCP: Bijan Jones NP           Past Medical History:   Diagnosis Date    Anxiety     Asthma     Ovarian cyst        Past Surgical History:   Procedure Laterality Date    HX CHOLECYSTECTOMY      HX CHOLECYSTECTOMY      2009         History reviewed. No pertinent family history.     Social History     Socioeconomic History    Marital status: SINGLE     Spouse name: Not on file    Number of children: Not on file    Years of education: Not on file    Highest education level: Not on file   Occupational History    Not on file   Social Needs    Financial resource strain: Not on file    Food insecurity:     Worry: Not on file     Inability: Not on file    Transportation needs:     Medical: Not on file     Non-medical: Not on file   Tobacco Use    Smoking status: Never Smoker    Smokeless tobacco: Never Used   Substance and Sexual Activity    Alcohol use: Yes     Comment: socially    Drug use: Not Currently    Sexual activity: Yes     Partners: Male   Lifestyle    Physical activity:     Days per week: Not on file     Minutes per session: Not on file    Stress: Not on file   Relationships    Social connections:     Talks on phone: Not on file     Gets together: Not on file     Attends Tenriism service: Not on file     Active member of club or organization: Not on file     Attends meetings of clubs or organizations: Not on file     Relationship status: Not on file    Intimate partner violence:     Fear of current or ex partner: Not on file     Emotionally abused: Not on file     Physically abused: Not on file     Forced sexual activity: Not on file   Other Topics Concern    Not on file   Social History Narrative    ** Merged History Encounter **              ALLERGIES: Latex; Dilaudid [hydromorphone (bulk)]; Dilaudid [hydromorphone]; and Nystatin-emollient combo no. 54    Review of Systems   Constitutional: Negative for activity change, appetite change, chills, diaphoresis, fatigue and fever. HENT: Positive for trouble swallowing (dry mouth). Negative for congestion, sore throat and tinnitus. Eyes: Negative for photophobia and visual disturbance. Respiratory: Positive for shortness of breath. Negative for apnea, choking and chest tightness. Cardiovascular: Positive for chest pain (midline). Gastrointestinal: Positive for constipation (when nervous ), diarrhea and nausea. Negative for abdominal pain (diffuse ) and vomiting. Genitourinary: Positive for pelvic pain (diffuse abd ). Negative for dysuria, frequency and vaginal discharge. Musculoskeletal: Negative for joint swelling and myalgias. Neurological: Positive for dizziness. Negative for tremors, seizures, syncope, weakness and headaches. Psychiatric/Behavioral: Negative for agitation, behavioral problems and confusion. All other systems reviewed and are negative. There were no vitals filed for this visit.          Physical Exam   Constitutional: She is oriented to person, place, and time. She appears well-developed and well-nourished. Non-toxic appearance. She does not appear ill. No distress. HENT:   Head: Normocephalic and atraumatic. Eyes: Pupils are equal, round, and reactive to light. EOM are normal.   Neck: Normal range of motion. Neck supple. Cardiovascular: Normal rate, regular rhythm and normal heart sounds. Exam reveals no gallop, no friction rub and no decreased pulses. Pulmonary/Chest: Effort normal and breath sounds normal. No apnea and no tachypnea. No respiratory distress. She has no wheezes. She has no rhonchi. She has no rales. She exhibits no bony tenderness, no edema and no retraction. Abdominal: Soft. She exhibits no distension. There is no splenomegaly or hepatomegaly. There is no tenderness. Musculoskeletal: Normal range of motion. Lymphadenopathy:     She has no cervical adenopathy. Neurological: She is alert and oriented to person, place, and time. No cranial nerve deficit. Skin: Skin is warm and dry. Capillary refill takes less than 2 seconds. No erythema. Psychiatric: She has a normal mood and affect. Her behavior is normal. Her mood appears not anxious. She is not agitated. Nursing note and vitals reviewed. ED EKG interpretation:  Rhythm: normal sinus rhythm; and regular . Rate (approx.): 90; Axis: normal; ST/T wave: normal; No EKG on file to compare     MDM  Number of Diagnoses or Management Options  Chest pain due to GERD:   Nausea without vomiting:   Diagnosis management comments: 29 yo F with  Hx of heartburn, Hx of drug use, OCP use, recent alcohol intake, asthma presents with midline cp 7/10, SOB, nausea, dizziness.  Had heavy meal and wine before onset  Unremarkable exam  Ddx include: heartburn, panic attack, PE, URI, pneumonia, musculoskeletal pain,   CBC, CMP, d dimer, UA, urine drug screen, CXR, EKG       Amount and/or Complexity of Data Reviewed  Clinical lab tests: ordered and reviewed  Tests in the radiology section of CPT®: ordered and reviewed  Tests in the medicine section of CPT®: ordered and reviewed      ED Course as of Aug 23 0144   Fri Aug 23, 2019   0023 Updated patient that her EKG is normal      [VY]   0044 Likely contaminated sample - pt ASmx   Leukocyte Esterase(!): SMALL [VY]   0044 wnl   CBC WITH AUTOMATED DIFF(!):    WBC 6.5   RBC 4.35   HGB 13.0   HCT 38.9   MCV 89.4   MCH 29.9   MCHC 33.4   RDW 11.9   PLATELET 408   MPV 81.5   NRBC 0.0   ABSOLUTE NRBC 0.00   NEUTROPHILS 53   LYMPHOCYTES 35   MONOCYTES 8   EOSINOPHILS 2   BASOPHILS 1   IMMATURE GRANULOCYTES 1(!)   ABS. NEUTROPHILS 3.6   ABS. LYMPHOCYTES 2.3   ABS. MONOCYTES 0.5   ABS. EOSINOPHILS 0.1   ABS. BASOPHILS 0.1   ABS. IMM. GRANS. 0.0   DF AUTOMATED [VY]   0044 Negative pregnancy test.  Spoke to patient about results. She reports to feel better, but nauseated. Will give Zofran     HCG URINE, QL:    HCG urine, QL NEGATIVE  [VY]   0104 Glucose(!): 147 [VY]   0105 Chloride(!): 112 [VY]   0105 Borderline abnormal findings. Will recommend to follow up OP with PCP for BG   BUN/Creatinine ratio(!): 11 [VY]   0109 No acute processes on CXR   XR CHEST PA LAT [VY]   0110 D-dimer normal   D-dimer: 0.54 [VY]   0114 Patient was afraid of an air bubble in IV line and became nauseated. Did not vomit, but regurgitated. Will give GI coctail    [VY]   1995 1:43 AM  Patient's results have been reviewed with them. Patient and/or family have verbally conveyed their understanding and agreement of the patient's signs, symptoms, diagnosis, treatment and prognosis and additionally agree to follow up as recommended or return to the Emergency Room should their condition change prior to follow-up. Discharge instructions have also been provided to the patient with some educational information regarding their diagnosis as well a list of reasons why they would want to return to the ER prior to their follow-up appointment should their condition change. [VY]      ED Course User Index  [VY] Eloy Gutierrez MD       Procedures        Patient was discussed with Dr Javier Da Silva (attending physician)    Leah Strogn MD

## 2019-08-23 NOTE — ED TRIAGE NOTES
Triage: During dinner this evening felt like she couldn't breathe used her inhaler without relief. HX: Asthma. Sudden onset of left sided chest pain non-radiating.  + Nausea.  + SOB, + N/T in both legs.

## 2019-08-26 ENCOUNTER — OFFICE VISIT (OUTPATIENT)
Dept: PRIMARY CARE CLINIC | Age: 29
End: 2019-08-26

## 2019-08-26 VITALS
OXYGEN SATURATION: 98 % | HEIGHT: 65 IN | WEIGHT: 162.2 LBS | BODY MASS INDEX: 27.02 KG/M2 | DIASTOLIC BLOOD PRESSURE: 79 MMHG | TEMPERATURE: 98.1 F | RESPIRATION RATE: 17 BRPM | HEART RATE: 86 BPM | SYSTOLIC BLOOD PRESSURE: 124 MMHG

## 2019-08-26 DIAGNOSIS — K21.9 GASTROESOPHAGEAL REFLUX DISEASE WITHOUT ESOPHAGITIS: Primary | ICD-10-CM

## 2019-08-26 RX ORDER — FAMOTIDINE 20 MG/1
20 TABLET, FILM COATED ORAL 2 TIMES DAILY
Qty: 30 TAB | Refills: 2 | Status: SHIPPED | OUTPATIENT
Start: 2019-08-26 | End: 2021-03-03 | Stop reason: ALTCHOICE

## 2019-08-26 NOTE — PROGRESS NOTES
[facility]   Dea Briceno 66[address] [facility], [address] [facility]  P: [phone]  F: [phone]      Chief Complaint   Patient presents with   [place] ED Follow-up     8/22/2019-[facility] for chest pain and shortness of breath       Hossein Breaux is a 30 y.o. female who presents to clinic for ED Follow-up (8/22/2019-[facility] for chest pain and shortness of breath). HPI:    Maria G is a 30 yr old who presents today for follow-up after recent visit to [address] [place] [address] ER for chest pain shortness of breath. She was discharged with diagnosis of chest pain due to GERD, and nausea without vomiting. She continues to endorse epigastric pain 2 out of 10, but has not taken the Zantac per discharge instructions from the ER. She denies any bowel or bladder disturbances today. Regarding mental health, the patient continues to endorse anxiety but is not currently taking any medication. She briefly trialed BuSpar and trazodone, with limited benefit and is mainly managing her anxieties through seeing a tele-health counselor. There are no active problems to display for this patient.      Past Medical History:   Diagnosis Date    Anxiety     Asthma     Ovarian cyst      Past Surgical History:   Procedure Laterality Date    HX CHOLECYSTECTOMY      HX CHOLECYSTECTOMY      2009     Social History     Socioeconomic History    Marital status: SINGLE     Spouse name: Not on file    Number of children: Not on file    Years of education: Not on file    Highest education level: Not on file   Occupational History    Not on file   Social Needs    Financial resource strain: Not on file    Food insecurity:     Worry: Not on file     Inability: Not on file    Transportation needs:     Medical: Not on file     Non-medical: Not on file   Tobacco Use    Smoking status: Never Smoker    Smokeless tobacco: Never Used   Substance and Sexual Activity    Alcohol use: Yes     Comment: socially    Drug use: Not Currently  Sexual activity: Yes     Partners: Male   Lifestyle    Physical activity:     Days per week: Not on file     Minutes per session: Not on file    Stress: Not on file   Relationships    Social connections:     Talks on phone: Not on file     Gets together: Not on file     Attends Orthodox service: Not on file     Active member of club or organization: Not on file     Attends meetings of clubs or organizations: Not on file     Relationship status: Not on file    Intimate partner violence:     Fear of current or ex partner: Not on file     Emotionally abused: Not on file     Physically abused: Not on file     Forced sexual activity: Not on file   Other Topics Concern    Not on file   Social History Narrative    ** Merged History Encounter **          History reviewed. No pertinent family history. Allergies   Allergen Reactions    Latex Swelling    Dilaudid [Hydromorphone (Bulk)] Other (comments)     hallucinations    Dilaudid [Hydromorphone] Hives     And hallucinations.  Nystatin-Emollient Combo No.54 Rash       Current Outpatient Medications   Medication Sig Dispense Refill    famotidine (PEPCID) 20 mg tablet Take 1 Tab by mouth two (2) times a day. 30 Tab 2    albuterol (PROVENTIL VENTOLIN) 2.5 mg /3 mL (0.083 %) nebulizer solution 3 mL by Nebulization route two (2) times daily as needed for Wheezing. 24 Each 1    albuterol (PROVENTIL HFA, VENTOLIN HFA, PROAIR HFA) 90 mcg/actuation inhaler Take 1 Puff by inhalation every six (6) hours as needed for Wheezing. 1 Inhaler 0    norgestimate-ethinyl estradiol (TRINESSA, 28,) 0.18/0.215/0.25 mg-35 mcg (28) tablet Take 1 Tab by mouth daily.  ibuprofen (MOTRIN) 600 mg tablet Take 1 Tab by mouth every six (6) hours as needed for Pain. 20 Tab 0    ondansetron hcl (ZOFRAN) 8 mg tablet Take 1 Tab by mouth every twelve (12) hours as needed for Nausea.  (Patient not taking: Reported on 8/26/2019) 10 Tab 0    raNITIdine (ACID CONTROL, RANITIDINE,) 75 mg tab Take 1 Tab by mouth nightly. (Patient not taking: Reported on 8/26/2019) 10 Tab 0           The medications were reviewed and updated in the medical record. The past medical history, past surgical history, and family history were reviewed and updated in the medical record. REVIEW OF SYSTEMS   Review of Systems   Constitutional: Negative for fever and malaise/fatigue. HENT: Negative for congestion. Eyes: Negative for blurred vision and pain. Respiratory: Negative for cough and shortness of breath. Cardiovascular: Negative for chest pain and palpitations. Gastrointestinal: Positive for heartburn. Negative for abdominal pain. Genitourinary: Negative for frequency and urgency. Musculoskeletal: Negative for joint pain and myalgias. Neurological: Negative for dizziness, tingling, sensory change, weakness and headaches. Psychiatric/Behavioral: Negative for depression, memory loss and substance abuse. The patient is nervous/anxious. PHYSICAL EXAM     Visit Vitals  /79 (BP 1 Location: Left arm, BP Patient Position: Sitting)   Pulse 86   Temp 98.1 °F (36.7 °C) (Oral)   Resp 17   Ht 5' 5\" (1.651 m)   Wt 162 lb 3.2 oz (73.6 kg)   SpO2 98%   BMI 26.99 kg/m²       Physical Exam   Constitutional: She is oriented to person, place, and time and well-developed, well-nourished, and in no distress. HENT:   Head: Normocephalic and atraumatic. Right Ear: External ear normal.   Left Ear: External ear normal.   Cardiovascular: Normal rate, regular rhythm, S1 normal, S2 normal and normal heart sounds. Exam reveals no gallop and no friction rub. No murmur heard. Pulmonary/Chest: Effort normal and breath sounds normal.   Abdominal:   + Pain to epigastric area   Musculoskeletal: Normal range of motion. She exhibits no edema. Neurological: She is alert and oriented to person, place, and time. Gait normal.   Skin: Skin is warm and dry.    Psychiatric: Affect and judgment normal. Her mood appears anxious. Talkative    Nursing note and vitals reviewed. ASSESSMENT/ PLAN   Diagnoses and all orders for this visit:    1. Gastroesophageal reflux disease without esophagitis  -     famotidine (PEPCID) 20 mg tablet; Take 1 Tab by mouth two (2) times a day. - Trial 2 weeks of H2 Blocker   -Discussed trigger foods like carbonated sodas and wine. -Reviewed prior EKG which was normal sinus rhythm and reviewed entire ER work-up. Follow-up and Dispositions    · Return if symptoms worsen or fail to improve. Disclaimer:  Advised patient to call back or return to office if symptoms worsen/change/persist.  Discussed expected course/resolution/complications of diagnosis in detail with patient.     Medication risks/benefits/alternatives discussed with patient. Patient was given an after visit summary which includes diagnoses, current medications, & vitals.      Discussed patient instructions and advised to read to all patient instructions regarding care.      Patient expressed understanding with the diagnosis and plan. This note will not be viewable in [address]         Shayy Joyce NP  8/26/2019        (This document has been electronically signed)

## 2019-08-26 NOTE — PROGRESS NOTES
Haylie Day is a 30 y.o. female    Chief Complaint   Patient presents with   [facility] ED Follow-up     8/22/2019-[facility] for chest pain and shortness of breath     1. Have you been to the ER, urgent care clinic since your last visit? Hospitalized since your last visit? Yes When: 8/22/2019 Where: [facility] Reason for visit: Chest pain and shortness of breath    2. Have you seen or consulted any other health care providers outside of the [address] [place] [address] since your last visit? Include any pap smears or colon screening.  No  Visit Vitals  /79 (BP 1 Location: Left arm, BP Patient Position: Sitting)   Pulse 86   Temp 98.1 °F (36.7 °C) (Oral)   Resp 17   Ht 5' 5\" (1.651 m)   Wt 162 lb 3.2 oz (73.6 kg)   SpO2 98%   BMI 26.99 kg/m²

## 2019-10-10 ENCOUNTER — OFFICE VISIT (OUTPATIENT)
Dept: PRIMARY CARE CLINIC | Age: 29
End: 2019-10-10

## 2019-10-10 ENCOUNTER — HOSPITAL ENCOUNTER (OUTPATIENT)
Dept: GENERAL RADIOLOGY | Age: 29
Discharge: HOME | End: 2019-10-10
Payer: MEDICAID

## 2019-10-10 VITALS
RESPIRATION RATE: 16 BRPM | HEART RATE: 85 BPM | HEIGHT: 65 IN | OXYGEN SATURATION: 98 % | TEMPERATURE: 98.2 F | WEIGHT: 165.2 LBS | SYSTOLIC BLOOD PRESSURE: 129 MMHG | BODY MASS INDEX: 27.52 KG/M2 | DIASTOLIC BLOOD PRESSURE: 85 MMHG
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
## 2019-10-10 DIAGNOSIS — M25.512 LEFT SHOULDER PAIN, UNSPECIFIED CHRONICITY: ICD-10-CM

## 2019-10-10 PROCEDURE — 73030 X-RAY EXAM OF SHOULDER: CPT

## 2019-10-10 NOTE — PROGRESS NOTES
[facility]   [address] [facility] [address] [place] [address]  P: [phone]  F: [phone]      Chief Complaint   Patient presents with    Arm Pain     rescued a horse and states that the horse bucked and threw her is having pain on the whole left side and states that she had times of confusion. states that she is concerned about neck shoulder and arm area       Gabe Tolbert is a 30 y.o. female who presents to clinic for Arm Pain (rescued a horse and states that the horse bucked and threw her is having pain on the whole left side and states that she had times of confusion. states that she is concerned about neck shoulder and arm area). HPI:    Presents today for horseback riding accident on 10/6. She states the horse bucked and threw her about 4 to 5 feet and she landed on her left shoulder. She endorses decreased range of motion and crepitus to her left shoulder. She denies any swelling or obvious deformities. She also has a large bruise to her left thigh from the accident but denies pain to area and feels that bruise is becoming smaller. She has been taking ibuprofen as needed with relief in pain. Pt denies headache, abdominal pain, saddle anesthesia, bowel/bladder issues. There are no active problems to display for this patient.        Past Medical History:   Diagnosis Date    Anxiety     Asthma     Ovarian cyst      Past Surgical History:   Procedure Laterality Date    HX CHOLECYSTECTOMY      HX CHOLECYSTECTOMY      2009     Social History     Socioeconomic History    Marital status: SINGLE     Spouse name: Not on file    Number of children: Not on file    Years of education: Not on file    Highest education level: Not on file   Occupational History    Not on file   Social Needs    Financial resource strain: Not on file    Food insecurity:     Worry: Not on file     Inability: Not on file    Transportation needs:     Medical: Not on file Non-medical: Not on file   Tobacco Use    Smoking status: Never Smoker    Smokeless tobacco: Never Used   Substance and Sexual Activity    Alcohol use: Yes     Comment: socially    Drug use: Not Currently    Sexual activity: Yes     Partners: Male   Lifestyle    Physical activity:     Days per week: Not on file     Minutes per session: Not on file    Stress: Not on file   Relationships    Social connections:     Talks on phone: Not on file     Gets together: Not on file     Attends Scientologist service: Not on file     Active member of club or organization: Not on file     Attends meetings of clubs or organizations: Not on file     Relationship status: Not on file    Intimate partner violence:     Fear of current or ex partner: Not on file     Emotionally abused: Not on file     Physically abused: Not on file     Forced sexual activity: Not on file   Other Topics Concern    Not on file   Social History Narrative    ** Merged History Encounter **          History reviewed. No pertinent family history. Allergies   Allergen Reactions    Latex Swelling    Dilaudid [Hydromorphone (Bulk)] Other (comments)     hallucinations    Dilaudid [Hydromorphone] Hives     And hallucinations.  Nystatin-Emollient Combo No.54 Rash       Current Outpatient Medications   Medication Sig Dispense Refill    famotidine (PEPCID) 20 mg tablet Take 1 Tab by mouth two (2) times a day. 30 Tab 2    albuterol (PROVENTIL VENTOLIN) 2.5 mg /3 mL (0.083 %) nebulizer solution 3 mL by Nebulization route two (2) times daily as needed for Wheezing. 24 Each 1    albuterol (PROVENTIL HFA, VENTOLIN HFA, PROAIR HFA) 90 mcg/actuation inhaler Take 1 Puff by inhalation every six (6) hours as needed for Wheezing. 1 Inhaler 0    norgestimate-ethinyl estradiol (TRINESSA, 28,) 0.18/0.215/0.25 mg-35 mcg (28) tablet Take 1 Tab by mouth daily.  ibuprofen (MOTRIN) 600 mg tablet Take 1 Tab by mouth every six (6) hours as needed for Pain.  20 Tab 0  ondansetron hcl (ZOFRAN) 8 mg tablet Take 1 Tab by mouth every twelve (12) hours as needed for Nausea. (Patient not taking: Reported on 8/26/2019) 10 Tab 0    raNITIdine (ACID CONTROL, RANITIDINE,) 75 mg tab Take 1 Tab by mouth nightly. (Patient not taking: Reported on 8/26/2019) 10 Tab 0           The medications were reviewed and updated in the medical record. The past medical history, past surgical history, and family history were reviewed and updated in the medical record. REVIEW OF SYSTEMS   Review of Systems   Constitutional: Negative for fever and malaise/fatigue. HENT: Negative for congestion. Eyes: Negative for blurred vision and pain. Respiratory: Negative for cough and shortness of breath. Cardiovascular: Negative for chest pain and palpitations. Gastrointestinal: Negative for abdominal pain and heartburn. Genitourinary: Negative for frequency and urgency. Musculoskeletal: Positive for joint pain. Negative for myalgias. Neurological: Negative for dizziness, tingling, sensory change, weakness and headaches. Psychiatric/Behavioral: Negative for depression, memory loss and substance abuse. PHYSICAL EXAM     Visit Vitals  /85 (BP 1 Location: Left arm, BP Patient Position: Sitting)   Pulse 85   Temp 98.2 °F (36.8 °C) (Oral)   Resp 16   Ht 5' 5\" (1.651 m)   Wt 165 lb 3.2 oz (74.9 kg)   SpO2 98%   BMI 27.49 kg/m²       Physical Exam   Constitutional: She is oriented to person, place, and time and well-developed, well-nourished, and in no distress. HENT:   Head: Normocephalic and atraumatic. Right Ear: External ear normal.   Left Ear: External ear normal.   Cardiovascular: Normal rate, regular rhythm, S1 normal, S2 normal and normal heart sounds. Exam reveals no gallop and no friction rub. No murmur heard. Pulmonary/Chest: Effort normal and breath sounds normal.   Musculoskeletal: She exhibits no edema.         Right shoulder: Normal.        Left shoulder: She exhibits decreased range of motion, tenderness and crepitus. She exhibits no swelling and no effusion. Neurological: She is alert and oriented to person, place, and time. Gait normal.   Skin: Skin is warm and dry. Psychiatric: Affect and judgment normal.   Nursing note and vitals reviewed. ASSESSMENT/ PLAN   Diagnoses and all orders for this visit:    1. Injury while horseback riding      -Landed on her left shoulder. Ordered x-ray per below. Encourage rest, ice to affected areas, and may continue short NSAID course. -PT is an option if she continues to have pain. 2. Left shoulder pain, unspecified chronicity  -     XR SHOULDER LT AP/LAT MIN 2 V; Future    Disclaimer:  Advised patient to call back or return to office if symptoms worsen/change/persist.  Discussed expected course/resolution/complications of diagnosis in detail with patient.     Medication risks/benefits/alternatives discussed with patient. Patient was given an after visit summary which includes diagnoses, current medications, & vitals.      Discussed patient instructions and advised to read to all patient instructions regarding care.      Patient expressed understanding with the diagnosis and plan. This note will not be viewable in [address]         Adrian Thomason NP  10/10/2019        (This document has been electronically signed)

## 2019-10-10 NOTE — PROGRESS NOTES
Chief Complaint   Patient presents with    Arm Pain     rescued a horse and states that the horse bucked and threw her is having pain on the whole left side and states that she had times of confusion.   states that she is concerned about neck shoulder and arm area

## 2020-03-02 DIAGNOSIS — J45.909 MODERATE ASTHMA WITHOUT COMPLICATION, UNSPECIFIED WHETHER PERSISTENT: ICD-10-CM

## 2020-03-02 RX ORDER — ALBUTEROL SULFATE 0.83 MG/ML
2.5 SOLUTION RESPIRATORY (INHALATION)
Qty: 24 EACH | Refills: 1 | Status: SHIPPED | OUTPATIENT
Start: 2020-03-02 | End: 2022-09-13 | Stop reason: SDUPTHER

## 2020-03-17 DIAGNOSIS — J45.909 MODERATE ASTHMA WITHOUT COMPLICATION, UNSPECIFIED WHETHER PERSISTENT: ICD-10-CM

## 2020-03-17 RX ORDER — ALBUTEROL SULFATE 90 UG/1
1 AEROSOL, METERED RESPIRATORY (INHALATION)
Qty: 1 INHALER | Refills: 0 | Status: SHIPPED | OUTPATIENT
Start: 2020-03-17 | End: 2020-04-13 | Stop reason: SDUPTHER

## 2020-04-13 ENCOUNTER — VIRTUAL VISIT (OUTPATIENT)
Dept: PRIMARY CARE CLINIC | Age: 30
End: 2020-04-13

## 2020-04-13 DIAGNOSIS — R00.0 RACING HEART BEAT: ICD-10-CM

## 2020-04-13 DIAGNOSIS — J45.909 MODERATE ASTHMA WITHOUT COMPLICATION, UNSPECIFIED WHETHER PERSISTENT: Primary | ICD-10-CM

## 2020-04-13 DIAGNOSIS — F41.9 ANXIETY: ICD-10-CM

## 2020-04-13 RX ORDER — ALBUTEROL SULFATE 90 UG/1
1 AEROSOL, METERED RESPIRATORY (INHALATION)
Qty: 1 INHALER | Refills: 3 | Status: SHIPPED | OUTPATIENT
Start: 2020-04-13 | End: 2021-03-29 | Stop reason: SDUPTHER

## 2020-04-13 RX ORDER — FLUTICASONE PROPIONATE 50 MCG
SPRAY, SUSPENSION (ML) NASAL
Qty: 1 BOTTLE | Refills: 1 | Status: SHIPPED | OUTPATIENT
Start: 2020-04-13 | End: 2020-04-13 | Stop reason: ALTCHOICE

## 2020-04-13 RX ORDER — FLUTICASONE PROPIONATE 50 MCG
SPRAY, SUSPENSION (ML) NASAL
Qty: 1 BOTTLE | Refills: 1 | Status: SHIPPED | OUTPATIENT
Start: 2020-04-13 | End: 2020-04-29 | Stop reason: SDUPTHER

## 2020-04-13 RX ORDER — PROPRANOLOL HYDROCHLORIDE 10 MG/1
10 TABLET ORAL 2 TIMES DAILY
Qty: 30 TAB | Refills: 0 | Status: SHIPPED | OUTPATIENT
Start: 2020-04-13 | End: 2021-03-03 | Stop reason: ALTCHOICE

## 2020-04-13 NOTE — PROGRESS NOTES
[facility]   [facility] [address], [address] [facility]  P: [phone]  F: [phone]    [address] is a 30 y.o. female who is seen over telehealth for Medication Refill and Asthma. She reports her seasonal allergies have been causing her sinus congestion. She states she has used Flonase in the past which is helped. She continues on a daily Zyrtec, and an as needed albuterol inhaler for when she is wheezing. When her asthma flares up she does nebulizer treatments. She states her dog ate her inhaler and she will need a refill today for that. Regarding anxiety, she states that the current pandemic has affected her tremendously. 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 She like her heart beats fast during periods of increased anxiety. She states her current therapist and she is interested in resources for counseling. There are no active problems to display for this patient.     Past Medical History:   Diagnosis Date    Anxiety     Asthma     Ovarian cyst      Past Surgical History:   Procedure Laterality Date    HX CHOLECYSTECTOMY      HX CHOLECYSTECTOMY      2009     Social History     Socioeconomic History    Marital status: SINGLE     Spouse name: Not on file    Number of children: Not on file    Years of education: Not on file    Highest education level: Not on file   Occupational History    Not on file   Social Needs    Financial resource strain: Not on file    Food insecurity     Worry: Not on file     Inability: Not on file    Transportation needs     Medical: Not on file     Non-medical: Not on file   Tobacco Use    Smoking status: Never Smoker    Smokeless tobacco: Never Used   Substance and Sexual Activity    Alcohol use: Yes     Comment: socially    Drug use: Not Currently    Sexual activity: Yes     Partners: Male   Lifestyle    Physical activity     Days per week: Not on file     Minutes per session: Not on file    Stress: Not on file   Relationships    Social connections     Talks on phone: Not on file     Gets together: Not on file     Attends [employer] service: Not on file     Active member of club or organization: Not on file     Attends meetings of clubs or organizations: Not on file     Relationship status: Not on file    Intimate partner violence     Fear of current or ex partner: Not on file     Emotionally abused: Not on file     Physically abused: Not on file     Forced sexual activity: Not on file   Other Topics Concern    Not on file   Social History Narrative    ** Merged History Encounter **          No family history on file. Allergies   Allergen Reactions    Latex Swelling    Dilaudid [Hydromorphone (Bulk)] Other (comments)     hallucinations    Dilaudid [Hydromorphone] Hives     And hallucinations.  Nystatin-Emollient Combo No.54 Rash       Current Outpatient Medications   Medication Sig Dispense Refill    albuterol (PROVENTIL HFA, VENTOLIN HFA, PROAIR HFA) 90 mcg/actuation inhaler Take 1 Puff by inhalation every six (6) hours as needed for Wheezing. 1 Inhaler 3    propranoloL (INDERAL) 10 mg tablet Take 1 Tab by mouth two (2) times a day. 30 Tab 0    fluticasone propionate (FLONASE) 50 mcg/actuation nasal spray 2 sprays each nostril daily. 1 Bottle 1    albuterol (PROVENTIL VENTOLIN) 2.5 mg /3 mL (0.083 %) nebu 3 mL by Nebulization route two (2) times daily as needed for Wheezing. 24 Each 1    famotidine (PEPCID) 20 mg tablet Take 1 Tab by mouth two (2) times a day. 30 Tab 2    ondansetron hcl (ZOFRAN) 8 mg tablet Take 1 Tab by mouth every twelve (12) hours as needed for Nausea. (Patient not taking: Reported on 8/26/2019) 10 Tab 0    raNITIdine (ACID CONTROL, RANITIDINE,) 75 mg tab Take 1 Tab by mouth nightly.  (Patient not taking: Reported on 8/26/2019) 10 Tab 0    norgestimate-ethinyl estradiol (TRINESSA, 28,) 0.18/0.215/0.25 mg-35 mcg (28) tablet Take 1 Tab by mouth daily.  ibuprofen (MOTRIN) 600 mg tablet Take 1 Tab by mouth every six (6) hours as needed for Pain. 20 Tab 0       The medications were reviewed and updated in the medical record. The past medical history, past surgical history, and family history were reviewed and updated in the medical record. REVIEW OF SYSTEMS   Review of Systems   Constitutional: Negative for malaise/fatigue. HENT: Negative for congestion. Eyes: Negative for blurred vision and pain. Respiratory: Negative for cough and shortness of breath. Cardiovascular: Negative for chest pain and palpitations. Gastrointestinal: Negative for abdominal pain and heartburn. Genitourinary: Negative for frequency and urgency. Musculoskeletal: Negative for joint pain and myalgias. Neurological: Negative for dizziness, tingling, sensory change, weakness and headaches. Psychiatric/Behavioral: Negative for depression, memory loss and substance abuse. PHYSICAL EXAM   NO VITALS WERE TAKEN FOR THIS VISIT  Physical Exam  Vitals signs and nursing note reviewed. HENT:      Head: Normocephalic and atraumatic. Right Ear: External ear normal.      Left Ear: External ear normal.   Musculoskeletal: Normal range of motion. Skin:     General: Skin is warm and dry. Neurological:      Mental Status: She is alert and oriented to person, place, and time. Gait: Gait is intact. Psychiatric:         Mood and Affect: Affect normal.         Judgment: Judgment normal.       ASSESSMENT/ PLAN   Diagnoses and all orders for this visit:    1. Moderate asthma without complication, unspecified whether persistent  -     albuterol (PROVENTIL HFA, VENTOLIN HFA, PROAIR HFA) 90 mcg/actuation inhaler; Take 1 Puff by inhalation every six (6) hours as needed for Wheezing.  -     fluticasone propionate (FLONASE) 50 mcg/actuation nasal spray; 2 sprays each nostril daily.     2. Anxiety  -     propranoloL (INDERAL) 10 mg tablet; Take 1 Tab by mouth two (2) times a day. -     REFERRAL TO BEHAVIORAL HEALTH    3. Racing heart beat  -     propranoloL (INDERAL) 10 mg tablet; Take 1 Tab by mouth two (2) times a day. -     REFERRAL TO BEHAVIORAL HEALTH      I was in the office while conducting this encounter. Consent:  She and/or her healthcare decision maker is aware that this patient-initiated Telehealth encounter is a billable service, with coverage as determined by her insurance carrier. She is aware that she may receive a bill and has provided verbal consent to proceed: Yes    This virtual visit was conducted via [address] Pursuant to the emergency declaration under the [facility] waiver authority and the [employer] Act, this Virtual  Visit was conducted to reduce the patient's risk of exposure to COVID-19 and provide continuity of care for an established patient. Services were provided through a video synchronous discussion virtually to substitute for in-person clinic visit. Due to this being a TeleHealth evaluation, many elements of the physical examination are unable to be assessed. Total Time: minutes: 5-10 minutes. Disclaimer:  Advised patient to call back or return to office if symptoms worsen/change/persist.  Discussed expected course/resolution/complications of diagnosis in detail with patient. Medication risks/benefits/alternatives discussed with patient. Patient was given an after visit summary which includes diagnoses, current medications, & vitals. Discussed patient instructions and advised to read to all patient instructions regarding care. Patient expressed understanding with the diagnosis and plan. This note will not be viewable in [address]         Agueda Campo NP  4/13/2020        (This document has been electronically signed)

## 2020-04-20 ENCOUNTER — VIRTUAL VISIT (OUTPATIENT)
Dept: PEDIATRICS CLINIC | Age: 30
End: 2020-04-20

## 2020-04-20 ENCOUNTER — DOCUMENTATION ONLY (OUTPATIENT)
Dept: PEDIATRICS CLINIC | Age: 30
End: 2020-04-20

## 2020-04-20 DIAGNOSIS — F43.0 ACUTE STRESS DISORDER: ICD-10-CM

## 2020-04-20 DIAGNOSIS — F43.9 TRAUMA AND STRESSOR-RELATED DISORDER: ICD-10-CM

## 2020-04-20 DIAGNOSIS — F41.1 GENERALIZED ANXIETY DISORDER: Primary | ICD-10-CM

## 2020-04-27 ENCOUNTER — VIRTUAL VISIT (OUTPATIENT)
Dept: PEDIATRICS CLINIC | Age: 30
End: 2020-04-27

## 2020-04-27 DIAGNOSIS — F41.1 GENERALIZED ANXIETY DISORDER: Primary | ICD-10-CM

## 2020-04-27 DIAGNOSIS — F43.0 ACUTE STRESS DISORDER: ICD-10-CM

## 2020-04-27 DIAGNOSIS — F43.9 TRAUMA AND STRESSOR-RELATED DISORDER: ICD-10-CM

## 2020-04-28 NOTE — PROGRESS NOTES
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 Laws and court interpretations change frequently. Legal advice must be tailored to the specific facts and circumstances of a particular case. Nothing reported herein should be used as a substitute for the advice of competent .

## 2020-04-29 DIAGNOSIS — J45.909 MODERATE ASTHMA WITHOUT COMPLICATION, UNSPECIFIED WHETHER PERSISTENT: ICD-10-CM

## 2020-04-29 RX ORDER — FLUTICASONE PROPIONATE 50 MCG
SPRAY, SUSPENSION (ML) NASAL
Qty: 1 BOTTLE | Refills: 1 | Status: SHIPPED | OUTPATIENT
Start: 2020-04-29 | End: 2022-09-13

## 2020-05-04 ENCOUNTER — VIRTUAL VISIT (OUTPATIENT)
Dept: PEDIATRICS CLINIC | Age: 30
End: 2020-05-04

## 2020-05-04 DIAGNOSIS — F43.9 TRAUMA AND STRESSOR-RELATED DISORDER: ICD-10-CM

## 2020-05-04 DIAGNOSIS — F41.1 GENERALIZED ANXIETY DISORDER: ICD-10-CM

## 2020-05-04 DIAGNOSIS — F43.0 ACUTE STRESS DISORDER: Primary | ICD-10-CM

## 2020-05-04 NOTE — PROGRESS NOTES
This mental health documentation will not be viewable by patient or the patient's proxy in [address] This mental health documentation is marked SENSITIVE in [facility]. Do not share this mental health documentation with anyone else- including parents/guardians, schools and other services providers:     Unless you have collaborated with the LCSW writing the note; or    Unless you are following applicable laws, policies and procedures related to the sharing of mental health documentation. This session was completed using asynchronous telephone counseling telehealth. Telehealth for mental health and [facility]HS informed consent statement was read to pt and/or their parent or legal guardian who provided verbal agreement and consent. Informed consent for IBHS and the telehealth informed consent statements are at the end of this note and were reviewed w/pt in initial session on 4/20/2020. Billing consent statement was provided by [facility] before session started with me. Confirmed that pt is in their home, address confirmed in EMR, confirmed pt emergency contacts in EMR. DATE:    4/27/2020        SESSION #: 2    SESSION LENGTH: 307p  358p 31 minutes [id number] GT and 95 modifier  used phone as eugenie was not connecting for pt, did call test on my end and all was in good order. PARTICIPANTS:      Maria G Murphy     SUBJECTIVE: (theme of session: patient observations, thoughts, direct quotes, symptoms reported)  Pt received packet and ,workbook I mailed her, she has not reviewed it yet due to finishing up assignments for the last weeks of school online. 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 We have been sewing non-stop for the last 4 days! It doesnt feel like a hobby I enjoy anymore because I am so tired of it. But I am glad that we do have the income and arent out there dying somewhere. Libertad Vaughan moms birthday is tomorrow and I dont know if I should text or call her or keep my boundaries. I am thinking about keeping my boundaries and not saying anything to her. But she might call me and yell at me for not calling her. Lorelei Espino, this virus is making my life a thousand times more stressful, it feels like everything is turned upside down. I dont want to catch it and die. .     OBJECTIVE: (MSE, Screening/Asst Measures, Hx info, Meds, Bx Observations)  PASCALE on 6/17/19 and 10/2019 score was 10    Medications? ? No  ? Yes     Current Outpatient Medications:     fluticasone propionate (FLONASE) 50 mcg/actuation nasal spray, 2 sprays each nostril daily. , Disp: 1 Bottle, Rfl: 1    albuterol (PROVENTIL HFA, VENTOLIN HFA, PROAIR HFA) 90 mcg/actuation inhaler, Take 1 Puff by inhalation every six (6) hours as needed for Wheezing., Disp: 1 Inhaler, Rfl: 3    propranoloL (INDERAL) 10 mg tablet, Take 1 Tab by mouth two (2) times a day., Disp: 30 Tab, Rfl: 0    albuterol (PROVENTIL VENTOLIN) 2.5 mg /3 mL (0.083 %) nebu, 3 mL by Nebulization route two (2) times daily as needed for Wheezing., Disp: 24 Each, Rfl: 1    famotidine (PEPCID) 20 mg tablet, Take 1 Tab by mouth two (2) times a day., Disp: 30 Tab, Rfl: 2    ondansetron hcl (ZOFRAN) 8 mg tablet, Take 1 Tab by mouth every twelve (12) hours as needed for Nausea. (Patient not taking: Reported on 8/26/2019), Disp: 10 Tab, Rfl: 0    raNITIdine (ACID CONTROL, RANITIDINE,) 75 mg tab, Take 1 Tab by mouth nightly. (Patient not taking: Reported on 8/26/2019), Disp: 10 Tab, Rfl: 0    norgestimate-ethinyl estradiol (TRINESSA, 28,) 0.18/0.215/0.25 mg-35 mcg (28) tablet, Take 1 Tab by mouth daily.   , Disp: , Rfl:     ibuprofen (MOTRIN) 600 mg tablet, Take 1 Tab by mouth every six (6) hours as needed for Pain., Disp: 20 Tab, Rfl: 0    Current Additions to Med Record:   CBD Oil gummies with melatonin for  sleep and CBD Oil for Anxiety, pt reports that her PCP is aware and that CBD oil helps a lot to ease my anxiety. I am super sensitive to medications, I tried some in the past but seriously it was horrible.  Re meds for anxiety and/or depression. 4/20/2020 Provided psychoed on SSRIs and anxiety and depression. Pt reports she would like to try counseling ongoing, she is not open to psychotropic meds to treat anxiety and depression. Due to pt fear of medications, brief psychoed on genesight for future consideration. MENTAL STATUS EXAM:  APPEARANCE ? Clean  ? Neat  ? Unkempt  ? Disheveled  - Just woke dup   LOOKS STATED AGE ? Yes ? No ? Younger ? Older   EYE CONTACT: ? Poor ? Good  ? Staring  ? Avoidant ? Varied ? Erratic   ORIENTATION   ? x4    ? Time  ? Place  ? Person  ? Situation      DEMEANOR   ? Apathetic  ? Boastful  ? Cold  ? Cooperative  ? Covert ? Demanding  ? Dramatic ? Evasive ? Friendly  ? Hostile  ? Irritable ? Seductive  ? Self-Depreciating  ? Guarded  ? Forthcoming   THOUGHT PROCESS ? Normal: logical, tight, linear, coherent, goal directed  ? Abnormal:  ? Circumstantial  ? Tangential  ? Loose  ? Flight of Ideas ? Perseveration  ? Word Salad   ? Clanging  ? Thought Blocking          THOUGHT CONTENT ? WNL/Appropriate  ? Inappropriate:  ? Preoccupations ? Delusions    ? Ideas of Reference ? Derealization  ? Depersonalization ? Paranoid   ? Ruminative  ? Intact  ? Derailed thinking     ? Hallucinating (visual, auditory, tactile):     SPEECH ? Clear ? Slurring  ? Slowed  ? Loud ? Soft  ? Mute  ? Pressured  ? Excessive ? Minimal  ? Incoherent   SENSORY DEFICITS ? Denied ? No Change since last MSE  ? Speech ? Hearing ? Vision- chart indicates glasses    MOTOR ? Normal ? Excessive ? Slow   INTERPERSONAL ? Interactive  ? Intermittently Interactive   ? Guarded  ? Withdrawn  ? Hostile   AFFECT ? Appropriate  ? Full Range  ? Inappropriate ? Blunted ? Constricted  ? Flat ? Suspicious ? Guarded ? Euthymic  ? Grandiose ? Labile ? Stable  ? Congruent ?  Incongruent   ATTENTION ? Attentive ? Inattentive ? Distractible    COGNITIVE PERFORMANCE ? Alert  ? Focused ? Organized  ? Disorganized ? Memory Intact   ? Memory Deficient: ? Short-Term  ? Long-Term    ? Developmental Disability  ? Slow Processing   MOOD ? Angry  ? Anxious  ? Ashamed  ? Over Stimulated ? Depressed  ? Euphoric  ? Relaxed ? Sad  ? Elated ? Worried  ? Hopeful     MOTIVATION ? Good    ? Fair    ? Poor   JUDGEMENT ? Good    ? Fair    ? Poor   INSIGHT ? Present    ? Partially Present    ? Absent   INTELLECT ? Average ? Above Average ? Below Average     RISK ASSESSMENT   Pt denies all below currently and by history. Suicide  Current Ideation: denied             Current Plan: denied         Current Attempt: none    Homicide  Current Ideation: denied              Current Plan: denied          Current Attempt: none    Significant Destruction of Property  Current Ideation: denied              Current Plan: denied          Current Attempt: none    ASSESSMENT: (assessment of S/O, content of session, intervention, patient response to intervention, progress in goals, diagnosis/diagnosis update)  Pt able to tolerate reflection of her thoughts about not contacting mom, able to tolerate reflective listening and encouragement for her to do what feels like is in her best interest, she doesnt tolerate exploration of options- she wants to just [facility] the decision and stick to it, psych flexibility is rigid, this is understandable give acute stress presentation and ptsd hx with significant anxiety. She is preferring to keep herself [employer] busy during current covid19 pandemic, is very resistant to reflection of self care and calming activities. She got a new video game she really likes, has been playing it all the time when I am free and states it helps me to laugh and be happy. Pt is in the midst of significant stress and anxiety, she builds rapport with reflective listening and minimal challenging and exploration today.  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 She states her sleep continues to be very difficult, she has the suggestions and tools from the packet I mailed her, but she has not explored or tried any of them. She appears to be comforted by having someone to talk to, to express herself to, she is building trusting rapport. It is good to have you to help me out right now, please dont be mad if I seem like I dont want to try new stuff right this time, but I will try to in the future- she appears to respond positively to reflection of her willingness to engage and build rapport. Goal Revision: ? No  ? Yes  ? N/A    Goal Progress Measures: Progressing, Maintaining, Regressing, Inconsistent, Mastered, Completed, Added New Today, Not Addressed    Trauma Informed Goals [after each item selected, indicate outcome measures (i.e., as evidenced by)]:     1. Build Rapport- Progressing  2. Continue Assessment- Progressing  3. Increase Prosocial Coping Skills to reduce anxiety, depression and trauma/acute stress symptoms  a. Develop Daily Schedule- Declined Today  b. Integrate Self Care- Progressing- taking time for herself with new video game  c. CBT for anxiety/stress- Pt agreed to mailing anxiety CBT workbook, I like the idea of having a structured workbook to help me. - Received it, Declined discussion today   d. Continue Boundaries from prior therapist re mother - Consistent  e. Improve Communication Skills- not always giving someone a response when I am triggered- Consistent- developing awareness and openness to discuss trigger and solutions  4.  Connect Patient with [employer] to Support Evidence Based Tx Interventions  5. Therapist and Patient to collaborate with other providers as appropriate  Ongoing  6. Therapist and Patient to FU with PCP for continuity in plan of care via, Maggie, CC and face to face as clinically indicated- Ongoing    Home-Based Work Reviewed:   ? No  ? Yes    ? N/A    Home-Based Work Recommended: ? No  ? Yes ? N/A      TRAUMA INFORMED EMPIRICALLY SUPPORTED CLINICAL INTERVENTIONS  Cognitive Behavioral Therapy Techniques (CBT)  Explored/Developed Awareness/Increased Insight:  ? Emotions/Feelings ? Coping Patterns ? Relationships ? Self Esteem   ? Boundaries  ? Biological Influences ? Psychological Influences   ? Social Influences ? Spiritual Influences ? Family Influences  Other CBT Techniques  ? Identifying/Exploring Cognitive Distortions ? Cognitive Triangle  ? Cognitive Challenging ? Cognitive Refocusing  ? Cognitive Reframing  ? Self-Monitoring  ? Supportive Reflection  ? Stress Management   ? Self/Other Boundaries Setting  ? Affect Identification and Expression ? Anger Management   ? Pattern Identification and Interruption ? Problem Solving  ? Interactive Feedback ? Interpersonal Resolutions ? Mindfulness  ? Relaxation/ Breathing ? Role Play/Behavioral Rehearsal    ? Symptom Management    ? Socratic Questioning  ? Metaphorical Reframing     ? Parenting Skills Training   Trauma Focused CBT Modules (TFCBT) [place] Informed Techniques   ? Gradual Exposure/Desensitization  ? Assessment/Engagement ? PsychoEd     ? Self-Care Plan ? Relaxation/Mindfulness ? Affect Modulation  ? Cognitive Coping  ? Trauma Narrative (Exposure/Cognitive Processing)  ? In Vivo Exposure ? Conjoint Narrative- IF CLINICALLY APPROPRIATE   ? Enhancing Future Safety ? Parenting Skills Training   Motivational Interviewing (MI):   ? Reflective Listening ? Open-Ended Strategies ? Affirmations             ? Supportive Statements ? Exploring Change ? Responding to Sustain Talk   ? Encourage Insight ? Emphasizing Personal Choice/Self-Empowerment        ? Summarizing ? Eliciting Self-Motiv. Statmts   Exploring: ? Problem Recognition ? Concerns ? Intent to Change  ? Optimism   Change Talk: ? Readiness Ruler ? Extremes ? Values  ? Rolling with Resistance ? Amplified Reflection ? Double Sided Reflection  Building Confidence: ? Open Ended ? Personal Strengths ? Past Success  Strengthening Commitment: ? Goals ? Plan ? Commitment to Plan- [facility] W[facility]   Behavior Activation (BA):   ? Sched. Behavior Activities ? Home-based Assignments      ? Therapist Modeling   ? Having Back-Up Plans                            ? Specific Problem Solving  ? Skills Training and Education  ? Action/TRAP/TRAC [employer]  ? Role Play        Acceptance and Commitment Therapy Techniques (ACT):   ? Present Moment ? Diffusion  ? Acceptance                   ? Self as Context ? Committed Action ? Values        ? Psychological Flexibility    Other Evidence Based Clinical Interventions:  ? Rapport Building  ? Assessment  ? Safety Planning  ? Reviewed Safety Plan   ? Review/Update Treatment Goals  ? Play Therapy Techniques ? Art Therapy Techniques ? DBT Technique  ? Structured Problem Solving ? Communication Skills  ? Social Skills  ? Recreation/Leisure Skills ? Self-Care Plan ? Review of All Meds   ? Review of Medical Conditions ? Psychoeducation- Meds   ? Psychoeducation- Other  ? Prevention Services ? Community Based Referral    ? Psychotropic Med Referral: ? PCP ? Psychiatric Provider  ? PCP Referral for Phy Health Issue ? Psychological Testing Referral       ? Parenting Skills Training  ? Neuropsychological Testing Referral ?Other     PLAN:  The progress of goals will be measured by patient report, parent report if appropriate, PCP report, collateral report if appropriate and therapist observation.      The duration/total number of sessions of [facility] expected is as needed and will be individual and family sessions using above listed interventions and other empirically supported interventions that are trauma informed such as TF CBT, other CBT, MI, BA, Art and/or Play Therapy Techniques and other empirically supported techniques as clinically appropriate and documented in each session. [facility] services are available to patients in collaboration with PCP unless otherwise discharged and noted in pt chart. Frequency is        weekly, pt really wants to have weekly therapy       will update plan if this changes. See patient again in   1   weeks. Referrals: ? No  ? Yes    ? N/A      The patient   verbalized understanding and agreement with the plan, goals and recommendations outlined herein. Documentation may include review of [facility] patient medical record, clinical interview, therapist observation and collaboration with pt primary care provider/referral source. 4/20/2020 Patients, parents and/or guardians have been provided psychoeducation on the limits of confidentiality. Patients, parents and/or guardians have been provided psychoeducation, alternate referral options, and are in agreement with MD, DO or NP who provided pt referral will have access to [facility] records. Pursuant to the emergency declaration under the [facility], 1135 waiver authority and the [employer] Act, this Virtual Visit was conducted, with patient and parent and/or guardians consent, to reduce the patient's risk of exposure to COVID-19 and provide continuity of care for an established patient. Due to the state of emergency related to the coronavirus, the [place] of Social Work and the [place] of [employer] is allowing practitioners holding my licensure and/or certification status the ability to provide telehealth services without the usual requirements.  The informed consent below comes from the [address] [place] [address], as noted and the only additions to the document have been put in BOLD. TELEHEALTH INFORMED CONSENT  4/20/2020  I Yusuf Bishop (name of patient) hereby consent to   participate in telemental health with Swapnil Allan, AISHA PAYTON (name of provider) as part of   my psychotherapy/Integrated [employer]. I understand that telemental health is the practice of delivering clinical health care services via technology assisted media or other electronic means between a practitioner and a patient who are located in two different locations. I understand the following with respect to telemental health:     1)I understand that I have the right to withdraw consent at any time without affecting my right to future care, services, or program benefits to which I would otherwise be entitled. 2)I understand that there are risk and consequences associated with telemental health, including but not limited to, disruption of transmission by technology failures, interruption and/or breaches of confidentiality by unauthorized persons, and/or limited ability to respond to emergencies. 3)I understand that there will be no recording of any of the online sessions by either party. I understand that [facility] Records are accessible by my treating Primary Care Providers at [facility], [address] or Setup. All information disclosed within sessions and written records pertaining to those sessions are confidential and may not be disclosed to anyone without written authorization, except where the disclosure is permitted and/or required by law.      4)I understand that the privacy laws that protect the confidentiality of my protected health information (PHI)also apply to telemental health unless an exception to confidentiality applies (i.e. mandatory reporting of child, elder, or vulnerable adult abuse; danger to self or others; I raise mental/emotional health as an issue in a legal proceeding). 5)I understand that if I am having suicidal or homicidal thoughts, actively experiencing psychotic symptoms or experiencing a mental health crisis that cannot be resolved remotely, it may be determined that telementalhealth services are not appropriate and a higher level of care is required. 6) I understand that during a telemental health session, we could encounter technical difficulties resulting in service interruptions. If this occurs, end and restart the session. If we are unable to reconnect within ten minutes, I will call you at the phone number used to access this session via [email or web address] ME to discuss since we may have to re-schedule. 7)I understand that my therapist may need to contact my emergency contact and/or appropriate authorities in case of an emergency. Emergency Protocols     I need to know your location in case of an emergency. You agree to inform me of the address where you are at the beginning of each session. I also need a  who I may contact on your behalf in a life-threatening emergency only. If the address in our EMR is different than the address where you are, it will be noted in the session note. EMR- Electronic Medical Record    This person will only be contacted to go to your location or take you to the hospital in the event of an emergency. If the emergency contact you provide me is different than the one that is listed in our EMR, you will provide me that information at the start of each session and it will be added to the session note. Only update if emergency contact is different than the one in our EMR.    In case of an emergency, my location is: _________________________________________   and my emergency s name, address, phone: ___________________________   ___________________________________________________________________________     Deshawn TATA Hankins has read the information provided above and discussed it with the patient and/or their legal guardian. I understand the information contained in this form and all of my questions have been answered to my satisfaction. Verbal Agreement was provided on the date of this session by the patient and/or their legal guardian.   _______________________________ _____________   Signature of client/parent/legal guardian Date   ________________________________ _______________   Signature of therapist Date     The information is provided as a service to members and the social work community for educational and information purposes only and does not constitute legal advice. We provide timely information, but we make no claims, promises or guarantees about the accuracy, completeness, or adequacy of the information contained in or linked to this Web site and its associated sites. Transmission of the information is not intended to create, and receipt does not constitute, a -client relationship between [facility], [facility], or the author(s) and you. [employer]W members and online readers should not act based on the information provided in the LDF Web site. Laws and court interpretations change frequently. Legal advice must be tailored to the specific facts and circumstances of a particular case. Nothing reported herein should be used as a substitute for the advice of competent .

## 2020-05-04 NOTE — PROGRESS NOTES
This mental health documentation will not be viewable by patient or the patient's proxy in [address] This mental health documentation is marked SENSITIVE in [facility]. Do not share this mental health documentation with anyone else- including parents/guardians, schools and other services providers:     Unless you have collaborated with the LCSW writing the note; or    Unless you are following applicable laws, policies and procedures related to the sharing of mental health documentation. This session was completed using synchronous video telehealth using doxy. me. Telehealth for mental health and [facility] informed consent statement was read to pt and/or their parent or legal guardian who provided verbal agreement and consent. Informed consent for IB and the telehealth informed consent statements are at the end of this note and were reviewed w/pt in initial session on 4/20/2020. [facility] session started with me. Confirmed that pt is in their home, address confirmed in EMR, confirmed pt emergency contacts in EMR. DATE:    5/4/2020        SESSION #: 3    SESSION LENGTH: 1001a  1044a- 43 minutes 13191, GT and 95 modifier per IBHS Coord. Zogran     PARTICIPANTS:      Maria G Jeffrey    SUBJECTIVE: (theme of session: patient observations, thoughts, direct quotes, symptoms reported)  I totally forgot it was my moms birthday last week until the end of the day! I was so busy all day, I didnt even remember until that evening when I saw the date on my phone. Bennie Bliss am really tired this morning. Last night I felt something crawling on me going on to explain she got up and checked and it was a spider, she was on her phone for a couple hours looking up the type of spider, found out it was a false black  and could not sleep due to fear that she would get bitten by a real black  because they have [address] for them in the backyard.      I hope this thing either subsides soon or we find a vaccine so we dont go back to normal life and all just die. . New semester of school started today, she feels overwhelmed by workload in statistics, she knows how to organize but does not feel it is helpful to her. Continued report of significant fears re to [address], despite staying home, she is fearful of the future. OBJECTIVE: (MSE, Screening/Asst Measures, Hx info, Meds, Bx Observations)  PASCALE on 6/17/19 and 10/2019 score was 10  PT was very anxious, amped up today, did not attempt measures, focused on rapport and affect regulation. Medications? ? No  ? Yes     Current Outpatient Medications:     fluticasone propionate (FLONASE) 50 mcg/actuation nasal spray, 2 sprays each nostril daily. , Disp: 1 Bottle, Rfl: 1    albuterol (PROVENTIL HFA, VENTOLIN HFA, PROAIR HFA) 90 mcg/actuation inhaler, Take 1 Puff by inhalation every six (6) hours as needed for Wheezing., Disp: 1 Inhaler, Rfl: 3    propranoloL (INDERAL) 10 mg tablet, Take 1 Tab by mouth two (2) times a day., Disp: 30 Tab, Rfl: 0    albuterol (PROVENTIL VENTOLIN) 2.5 mg /3 mL (0.083 %) nebu, 3 mL by Nebulization route two (2) times daily as needed for Wheezing., Disp: 24 Each, Rfl: 1    famotidine (PEPCID) 20 mg tablet, Take 1 Tab by mouth two (2) times a day., Disp: 30 Tab, Rfl: 2    ondansetron hcl (ZOFRAN) 8 mg tablet, Take 1 Tab by mouth every twelve (12) hours as needed for Nausea. (Patient not taking: Reported on 8/26/2019), Disp: 10 Tab, Rfl: 0    raNITIdine (ACID CONTROL, RANITIDINE,) 75 mg tab, Take 1 Tab by mouth nightly. (Patient not taking: Reported on 8/26/2019), Disp: 10 Tab, Rfl: 0    norgestimate-ethinyl estradiol (TRINESSA, 28,) 0.18/0.215/0.25 mg-35 mcg (28) tablet, Take 1 Tab by mouth daily.   , Disp: , Rfl:     ibuprofen (MOTRIN) 600 mg tablet, Take 1 Tab by mouth every six (6) hours as needed for Pain., Disp: 20 Tab, Rfl: 0      Current Additions to Med Record:   CBD Oil gummies with melatonin for  sleep and CBD Oil for Anxiety, pt reports that her PCP is aware and that CBD oil helps a lot to ease my anxiety. I am super sensitive to medications, I tried some in the past but seriously it was horrible.  Re meds for anxiety and/or depression. 4/20/2020 Provided psychoed on SSRIs and anxiety and depression. Pt reports she would like to try counseling ongoing, she is not open to psychotropic meds to treat anxiety and depression. Due to pt fear of medications, brief psychoed on genesight for future consideration. MENTAL STATUS EXAM:  APPEARANCE ? Clean  ? Neat  ? Unkempt  ? Disheveled  - Just woke up, oily hair   LOOKS STATED AGE ? Yes ? No ? Younger ? Older   EYE CONTACT: ? Poor ? Good  ? Staring  ? Avoidant ? Varied ? Erratic   ORIENTATION   ? x4    ? Time  ? Place  ? Person  ? Situation      DEMEANOR   ? Apathetic  ? Boastful  ? Cold  ? Cooperative  ? Covert ? Demanding  ? Dramatic ? Evasive ? Friendly  ? Hostile  ? Irritable ? Seductive  ? Self-Depreciating  ? Guarded  ? Forthcoming   THOUGHT PROCESS ? Normal: logical, tight, linear, coherent, goal directed  ? Abnormal:  ? Circumstantial  ? Tangential  ? Loose  ? Flight of Ideas ? Perseveration  ? Word Salad   ? Clanging  ? Thought Blocking          THOUGHT CONTENT ? WNL/Appropriate  ? Inappropriate:  ? Preoccupations ? Delusions    ? Ideas of Reference ? Derealization  ? Depersonalization ? Paranoid   ? Ruminative  ? Intact  ? Derailed thinking     ? Hallucinating (visual, auditory, tactile):     SPEECH ? Clear ? Slurring  ? Slowed  ? Loud ? Soft  ? Mute  ? Pressured  ? Excessive ? Minimal  ? Incoherent   SENSORY DEFICITS ? Denied ? No Change since last MSE  ? Speech ? Hearing ? Vision- chart indicates glasses    MOTOR ? Normal ? Excessive ? Slow   INTERPERSONAL ? Interactive  ? Intermittently Interactive   ? Guarded  ? Withdrawn  ? Hostile   AFFECT ? Appropriate  ? Full Range  ? Inappropriate ? Blunted ? Constricted  ? Flat ? Suspicious ? Guarded ? Euthymic  ? Grandiose ? Labile ? Stable  ? Congruent ? Incongruent   ATTENTION ? Attentive ? Inattentive ? Distractible    COGNITIVE PERFORMANCE ? Alert  ? Focused ? Organized  ? Disorganized ? Memory Intact   ? Memory Deficient: ? Short-Term  ? Long-Term    ? Developmental Disability  ? Slow Processing   MOOD ? Angry  ? Anxious  ? Ashamed  ? Over Stimulated ? Depressed  ? Euphoric  ? Relaxed ? Sad  ? Elated ? Worried  ? Hopeful     MOTIVATION ? Good    ? Fair    ? Poor   JUDGEMENT ? Good    ? Fair    ? Poor   INSIGHT ? Present    ? Partially Present    ? Absent   INTELLECT ? Average ? Above Average ? Below Average     RISK ASSESSMENT   Pt denies all below currently and by history. Suicide  Current Ideation: denied             Current Plan: denied         Current Attempt: none    Homicide  Current Ideation: denied              Current Plan: denied          Current Attempt: none    Significant Destruction of Property  Current Ideation: denied              Current Plan: denied          Current Attempt: none    ASSESSMENT: (assessment of S/O, content of session, intervention, patient response to intervention, progress in goals, diagnosis/diagnosis update)  Pt politely avoided discussion of trying guided med anna to help with sleep from last session; avoided encouragement to re-engage in using her daily planner; eventually was verbalized that she did not want to do the planner by the time I put everything in it, I could have done it all, she is very overstimulated an anxious this morning, just woke up. Focus of session is on reflective listening, rapport, some cog challenging, reframing and refocusing. She continues to play the video game and is focused on keeping my mind busy vs less active and thinking focused relaxation skills. Pt did get tearful with reflection of this being her process, her doing the best she can, validation of her feelings.  She did tolerate reflection of her triple booking herself and I took a time management class last semester and learned all this, I just hate doing it but know it will help. It is good to have you to talk to. At work I used to talk to the girls about all this stuff, and when my therapist left, I felt alone, but thank you for being patient with me and for understanding. . Her 30th birthday is on [birth year], she and boyfriend plan to do something at home together. Goal Revision: ? No  ? Yes  ? N/A    Goal Progress Measures: Progressing, Maintaining, Regressing, Inconsistent, Mastered, Completed, Added New Today, Not Addressed    Trauma Informed Goals [after each item selected, indicate outcome measures (i.e., as evidenced by)]:     1. Build Rapport- Progressing  2. Continue Assessment- Progressing  3. Increase Prosocial Coping Skills to reduce anxiety, depression and trauma/acute stress symptoms  a. Develop Daily Schedule- Declined Today on Paper, reported she was getting close to making lists at least so I dont keep triple booking myself.   b. [address]- taking time for herself with new video game  c. CBT for anxiety/stress- Declined discussion today, given reflection of positive verbal expression of her wants and needs vs using workbook today   d. Continue Boundaries from prior therapist re mother - Consistent  e. Improve Communication Skills- not always giving someone a response when I am triggered- Consistent- has been putting comments on some kind of hold on [employer] so she doesnt seem the all and feel the need to respond to them. Feels better to go on there now a little. .   4. Connect Patient with [facility] to Support Evidence Based Tx Interventions  5. Therapist and Patient to collaborate with other providers as appropriate  Ongoing  6.  Therapist and Patient to FU with PCP for continuity in plan of care via, Maggie, CC and face to face as clinically indicated- Ongoing    Home-Based Work Reviewed:   ? No  ? Yes    ? N/A    Home-Based Work Recommended: ? No  ? Yes ? N/A      TRAUMA INFORMED EMPIRICALLY SUPPORTED CLINICAL INTERVENTIONS  Cognitive Behavioral Therapy Techniques (CBT)  Explored/Developed Awareness/Increased Insight:  ? Emotions/Feelings ? Coping Patterns ? Relationships ? Self Esteem   ? Boundaries  ? Biological Influences ? Psychological Influences   ? Social Influences ? Spiritual Influences ? Family Influences  Other CBT Techniques  ? Identifying/Exploring Cognitive Distortions ? Cognitive Triangle  ? Cognitive Challenging ? Cognitive Refocusing  ? Cognitive Reframing  ? Self-Monitoring  ? Supportive Reflection  ? Stress Management   ? Self/Other Boundaries Setting  ? Affect Identification and Expression ? Anger Management   ? Pattern Identification and Interruption ? Problem Solving  ? Interactive Feedback ? Interpersonal Resolutions ? Mindfulness  ? Relaxation/ Breathing ? Role Play/Behavioral Rehearsal  ? Symptom Management  ? Socratic Questioning  ? Metaphorical Reframing     ? Parenting Skills Training   Trauma Focused CBT Modules (TFCBT) Ness Arroyo Informed Techniques   ? Gradual Exposure/Desensitization  ? Assessment/Engagement ? PsychoEd     ? Self-Care Plan ? Relaxation/Mindfulness ? Affect Modulation- co-regulation for pt today appeared to slightly improve her anxiety presentation   ? Cognitive Coping  ? Trauma Narrative (Exposure/Cognitive Processing)  ? In Vivo Exposure ? Conjoint Narrative- IF CLINICALLY APPROPRIATE   ? Enhancing Future Safety ? Parenting Skills Training   Motivational Interviewing (MI):   ? Reflective Listening ? Open-Ended Strategies ? Affirmations             ? Supportive Statements ? Exploring Change ? Responding to Sustain Talk   ? Encourage Insight ? Emphasizing Personal Choice/Self-Empowerment        ? Summarizing ? Eliciting Self-Motiv. Statmts   Exploring: ? Problem Recognition ? Concerns ? Intent to Change  ? Optimism Change Talk: ? Readiness Ruler ? Extremes ? Values  ? Rolling with Resistance ? Amplified Reflection ? Double Sided Reflection  Building Confidence: ? Open Ended ? Personal Strengths ? Past Success  Strengthening Commitment: ? Goals ? Plan ? Commitment to Plan- [facility] W[facility]   Behavior Activation (BA):   ? Sched. Behavior Activities ? Home-based Assignments      ? Therapist Modeling   ? Having Back-Up Plans                            ? Specific Problem Solving  ? Skills Training and Education  ? Action/TRAP/TRAC [employer]  ? Role Play        Acceptance and Commitment Therapy Techniques (ACT):   ? Present Moment ? Diffusion  ? Acceptance                   ? Self as Context ? Committed Action ? Values        ? Psychological Flexibility    Other Evidence Based Clinical Interventions:  ? Rapport Building  ? Assessment  ? Safety Planning  ? Reviewed Safety Plan   ? Review/Update Treatment Goals  ? Play Therapy Techniques ? Art Therapy Techniques ? DBT Technique  ? Structured Problem Solving ? Communication Skills  ? Social Skills  ? Recreation/Leisure Skills ? Self-Care Plan ? Review of All Meds   ? Review of Medical Conditions ? Psychoeducation- Meds   ? Psychoeducation- Other  ? Prevention Services ? Community Based Referral    ? Psychotropic Med Referral: ? PCP ? Psychiatric Provider  ? PCP Referral for Phy Health Issue ? Psychological Testing Referral       ? Parenting Skills Training  ? Neuropsychological Testing Referral ?Other     PLAN:  The progress of goals will be measured by patient report, parent report if appropriate, PCP report, collateral report if appropriate and therapist observation.      The duration/total number of sessions of IBHS expected is as needed and will be individual and family sessions using above listed interventions and other empirically supported interventions that are trauma informed such as TF CBT, other CBT, MI, BA, Art and/or Play Therapy Techniques and other empirically supported techniques as clinically appropriate and documented in each session. IB services are available to patients in collaboration with PCP unless otherwise discharged and noted in pt chart. Frequency is        weekly, pt really wants to have weekly therapy       will update plan if this changes. See patient again in   1   weeks. Referrals: ? No  ? Yes    ? N/A      The patient   verbalized understanding and agreement with the plan, goals and recommendations outlined herein. Documentation may include review of [facility] patient medical record, clinical interview, therapist observation and collaboration with pt primary care provider/referral source. 4/20/2020 Patients, parents and/or guardians have been provided psychoeducation on the limits of confidentiality. Patients, parents and/or guardians have been provided psychoeducation, alternate referral options, and are in agreement with MD, DO or NP who provided pt referral will have access to [facility] records. Pursuant to the emergency declaration under the [facility], [facility]5 waiver authority and the [employer] and [employer] Act, this Virtual Visit was conducted, with patient and parent and/or guardians consent, to reduce the patient's risk of exposure to COVID-19 and provide continuity of care for an established patient. Due to the state of emergency related to the coronavirus, the [place] of Social [place] and the [place] of [employer] is allowing practitioners holding my licensure and/or certification status the ability to provide telehealth services without the usual requirements. The informed consent below comes from the [address] [place] [address], as noted and the only additions to the document have been put in BOLD.           TELEHEALTH INFORMED CONSENT  4/20/2020  VIRAJ Noemi Horton (name of patient) hereby consent to   participate in telemental health with Jeffery Morin LCSW, BRENDAOTP (name of provider) as part of   my psychotherapy/Integrated [address]. I understand that telemental health is the practice of delivering clinical health care services via technology assisted media or other electronic means between a practitioner and a patient who are located in two different locations. I understand the following with respect to telemental health:     1)I understand that I have the right to withdraw consent at any time without affecting my right to future care, services, or program benefits to which I would otherwise be entitled. 2)I understand that there are risk and consequences associated with telemental health, including but not limited to, disruption of transmission by technology failures, interruption and/or breaches of confidentiality by unauthorized persons, and/or limited ability to respond to emergencies. 3)I understand that there will be no recording of any of the online sessions by either party. I understand that [address] Records are accessible by my treating Primary Care Providers at [facility], [address] or [employer]. All information disclosed within sessions and written records pertaining to those sessions are confidential and may not be disclosed to anyone without written authorization, except where the disclosure is permitted and/or required by law. 4)I understand that the privacy laws that protect the confidentiality of my protected health information (PHI)also apply to telemental health unless an exception to confidentiality applies (i.e. mandatory reporting of child, elder, or vulnerable adult abuse; danger to self or others; I raise mental/emotional health as an issue in a legal proceeding).     5)I understand that if I am having suicidal or homicidal thoughts, actively experiencing psychotic symptoms or experiencing a mental health crisis that cannot be resolved remotely, it may be determined that telementalhealth services are not appropriate and a higher level of care is required. 6) I understand that during a telemental health session, we could encounter technical difficulties resulting in service interruptions. If this occurs, end and restart the session. If we are unable to reconnect within ten minutes, I will call you at the phone number used to access this session via [email or web address]. ME to discuss since we may have to re-schedule. 7)I understand that my therapist may need to contact my emergency contact and/or appropriate authorities in case of an emergency. Emergency Protocols     I need to know your location in case of an emergency. You agree to inform me of the address where you are at the beginning of each session. I also need a  who I may contact on your behalf in a life-threatening emergency only. If the address in our EMR is different than the address where you are, it will be noted in the session note. EMR- Electronic Medical Record    This person will only be contacted to go to your location or take you to the hospital in the event of an emergency. If the emergency contact you provide me is different than the one that is listed in our EMR, you will provide me that information at the start of each session and it will be added to the session note. Only update if emergency contact is different than the one in our EMR. In case of an emergency, my location is: _________________________________________   and my emergency s name, address, phone: ___________________________   ___________________________________________________________________________     Luther Mohan LCSW has read the information provided above and discussed it with the patient and/or their legal guardian. I understand the information contained in this form and all of my questions have been answered to my satisfaction.      Verbal Agreement was provided on the date of this session by the patient and/or their legal guardian.   _______________________________ _____________   Signature of client/parent/legal guardian Date   ________________________________ _______________   Signature of therapist Date     The information is provided as a service to members and the social work community for educational and information purposes only and does not constitute legal advice. We provide timely information, but we make no claims, promises or guarantees about the accuracy, completeness, or adequacy of the information contained in or linked to this Web site and its associated sites. Transmission of the information is not intended to create, and receipt does not constitute, a -client relationship between [facility], [facility], or the author(s) and you. [facility] members and online readers should not act based on the information provided in the [facility] Web site. Laws and court interpretations change frequently. Legal advice must be tailored to the specific facts and circumstances of a particular case. Nothing reported herein should be used as a substitute for the advice of competent .

## 2020-05-11 ENCOUNTER — DOCUMENTATION ONLY (OUTPATIENT)
Dept: PEDIATRICS CLINIC | Age: 30
End: 2020-05-11

## 2020-05-11 NOTE — PROGRESS NOTES
Pt called today just after 12 noon, spoke to JUDY PALOMARES and cancelled 3pm for today. Pt let PSR know she would resume next week. PSR let me know, so I am documenting for collab with PCP. Pt has 5/18 and 5/26 appts to see me on the schedule.

## 2020-05-26 ENCOUNTER — DOCUMENTATION ONLY (OUTPATIENT)
Dept: PEDIATRICS CLINIC | Age: 30
End: 2020-05-26

## 2020-05-26 NOTE — PROGRESS NOTES
No Show Documentation     User Action Outcome Comment   Count includes the [facility]  5/26/2020   2:50 PM Called home No answer PSR attempted to initiate IBHS Session   Count includes the [facility]  5/26/2020   3:00 PM Called home No answer PSR attempted to initiate IBHS Session   Count includes the [facility]  5/26/2020   3:02 PM Called home No answer IBHS Therapist attempted to initiate IBHS Session   Count includes the [facility]  5/26/2020   3:05 PM Called home Spoke with patient PSR spoke to pt, pt cancelled 5 minutes into appt time   Count includes the [facility]  5/26/2020   3:07 PM Sent letter  Pt ref out x3 no show/cancel at

## 2020-08-14 ENCOUNTER — OFFICE VISIT (OUTPATIENT)
Dept: PRIMARY CARE CLINIC | Age: 30
End: 2020-08-14
Payer: COMMERCIAL

## 2020-08-14 VITALS
SYSTOLIC BLOOD PRESSURE: 122 MMHG | HEIGHT: 65 IN | OXYGEN SATURATION: 100 % | WEIGHT: 170.8 LBS | TEMPERATURE: 97.7 F | BODY MASS INDEX: 28.46 KG/M2 | HEART RATE: 72 BPM | DIASTOLIC BLOOD PRESSURE: 84 MMHG | RESPIRATION RATE: 16 BRPM

## 2020-08-14 DIAGNOSIS — M25.512 ACUTE PAIN OF LEFT SHOULDER: Primary | ICD-10-CM

## 2020-08-14 PROCEDURE — 99213 OFFICE O/P EST LOW 20 MIN: CPT | Performed by: NURSE PRACTITIONER

## 2020-08-14 RX ORDER — CYCLOBENZAPRINE HCL 10 MG
10 TABLET ORAL
Qty: 15 TAB | Refills: 0 | Status: SHIPPED | OUTPATIENT
Start: 2020-08-14 | End: 2022-09-13

## 2020-08-14 NOTE — PROGRESS NOTES
Chief Complaint   Patient presents with    Shoulder Pain     Patient presents today c/o left shoulder pain that has radiated down causing her chest pain

## 2020-08-14 NOTE — LETTER
NOTIFICATION OF RETURN TO WORK / SCHOOL 
 
8/14/2020 Ms. Anyi He 
[place] [phone] To Whom It May Concern: 
 
Anyi He was under the care of [facility]. She may return to work, I advise no heavy lifting more than 20 lbs at a time for 5 days. If there are questions or concerns please have the patient contact our office. Sincerely, Maty Araujo NP

## 2021-03-03 ENCOUNTER — OFFICE VISIT (OUTPATIENT)
Dept: PRIMARY CARE CLINIC | Age: 31
End: 2021-03-03
Payer: COMMERCIAL

## 2021-03-03 VITALS
RESPIRATION RATE: 16 BRPM | SYSTOLIC BLOOD PRESSURE: 136 MMHG | WEIGHT: 190.6 LBS | HEART RATE: 92 BPM | TEMPERATURE: 98.5 F | BODY MASS INDEX: 31.75 KG/M2 | HEIGHT: 65 IN | DIASTOLIC BLOOD PRESSURE: 93 MMHG | OXYGEN SATURATION: 100 %

## 2021-03-03 DIAGNOSIS — Z83.3 FAMILY HISTORY OF DIABETES MELLITUS (DM): ICD-10-CM

## 2021-03-03 DIAGNOSIS — R63.5 WEIGHT GAIN: ICD-10-CM

## 2021-03-03 DIAGNOSIS — F90.9 ATTENTION DEFICIT HYPERACTIVITY DISORDER (ADHD), UNSPECIFIED ADHD TYPE: ICD-10-CM

## 2021-03-03 DIAGNOSIS — R63.1 POLYDIPSIA: Primary | ICD-10-CM

## 2021-03-03 PROCEDURE — 99214 OFFICE O/P EST MOD 30 MIN: CPT | Performed by: NURSE PRACTITIONER

## 2021-03-03 RX ORDER — DEXTROAMPHETAMINE SACCHARATE, AMPHETAMINE ASPARTATE, DEXTROAMPHETAMINE SULFATE AND AMPHETAMINE SULFATE 2.5; 2.5; 2.5; 2.5 MG/1; MG/1; MG/1; MG/1
10 TABLET ORAL DAILY
Qty: 30 TAB | Refills: 0 | Status: SHIPPED | OUTPATIENT
Start: 2021-03-03

## 2021-03-03 NOTE — PROGRESS NOTES
Chief Complaint   Patient presents with    Diabetes     patient wants to be tested for diabetes/  w/labs      1. Have you been to the ER, urgent care clinic since your last visit? Hospitalized since your last visit? No    2. Have you seen or consulted any other health care providers outside of the [address] [place] [address] since your last visit? Include any pap smears or colon screening.  No

## 2021-03-03 NOTE — PROGRESS NOTES
[facility]   [facility] [address], [address] [facility]  P: [phone]  F: [phone]      [address] is a 35 y.o. female who presents to clinic for Diabetes (patient wants to be tested for diabetes/  w/labs ). Reports increasing polydipsia and polyuria over the past couple months. Family history positive for mother with diabetes, diabetes on her dad's side of the family of note, most recent lab work 8/23/2019 does show a glucose of 147 unsure if fasting. She did have an A1c 4/10/2019 at 5.3. Also, has a new therapist who has voiced concerns for ADHD and OCD. The patient briefly tried Zoloft in the past but had worsening of her mood, has been on Adderall in the past with benefit and would like to discuss that today. She complains of inattention that is affecting her daily life. There are no active problems to display for this patient.     Past Medical History:   Diagnosis Date    Anxiety     Asthma     Ovarian cyst      Past Surgical History:   Procedure Laterality Date    HX CHOLECYSTECTOMY      HX CHOLECYSTECTOMY      2009     Social History     Socioeconomic History    Marital status: SINGLE     Spouse name: Not on file    Number of children: Not on file    Years of education: Not on file    Highest education level: Not on file   Occupational History    Not on file   Social Needs    Financial resource strain: Not on file    Food insecurity     Worry: Not on file     Inability: Not on file    Transportation needs     Medical: Not on file     Non-medical: Not on file   Tobacco Use    Smoking status: Never Smoker    Smokeless tobacco: Never Used   Substance and Sexual Activity    Alcohol use: Yes     Comment: socially    Drug use: Not Currently    Sexual activity: Yes     Partners: Male   Lifestyle    Physical activity     Days per week: Not on file     Minutes per session: Not on file    Stress: Not on file   Relationships    Social connections     Talks on phone: Not on file     Gets together: Not on file     Attends Judaism service: Not on file     Active member of club or organization: Not on file     Attends meetings of clubs or organizations: Not on file     Relationship status: Not on file    Intimate partner violence     Fear of current or ex partner: Not on file     Emotionally abused: Not on file     Physically abused: Not on file     Forced sexual activity: Not on file   Other Topics Concern    Not on file   Social History Narrative    ** Merged History Encounter **          No family history on file. Allergies   Allergen Reactions    Latex Swelling    Dilaudid [Hydromorphone (Bulk)] Other (comments)     hallucinations    Dilaudid [Hydromorphone] Hives     And hallucinations.  Nystatin-Emollient Combo No.54 Rash       Current Outpatient Medications   Medication Sig Dispense Refill    dextroamphetamine-amphetamine (ADDERALL) 10 mg tablet Take 1 Tab by mouth daily. Max Daily Amount: 10 mg. 30 Tab 0    albuterol (PROVENTIL HFA, VENTOLIN HFA, PROAIR HFA) 90 mcg/actuation inhaler Take 1 Puff by inhalation every six (6) hours as needed for Wheezing. 1 Inhaler 3    albuterol (PROVENTIL VENTOLIN) 2.5 mg /3 mL (0.083 %) nebu 3 mL by Nebulization route two (2) times daily as needed for Wheezing. 24 Each 1    cyclobenzaprine (FLEXERIL) 10 mg tablet Take 1 Tab by mouth nightly. 15 Tab 0    fluticasone propionate (FLONASE) 50 mcg/actuation nasal spray 2 sprays each nostril daily. 1 Bottle 1           The medications were reviewed and updated in the medical record. The past medical history, past surgical history, and family history were reviewed and updated in the medical record. REVIEW OF SYSTEMS   Review of Systems   Constitutional: Negative for malaise/fatigue. HENT: Negative for congestion. Eyes: Negative for blurred vision and pain. Respiratory: Negative for cough and shortness of breath.     Cardiovascular: Negative for chest pain and palpitations. Gastrointestinal: Negative for abdominal pain and heartburn. Genitourinary: Negative for frequency and urgency. Musculoskeletal: Negative for joint pain and myalgias. Neurological: Negative for dizziness, tingling, sensory change, weakness and headaches. Psychiatric/Behavioral: Negative for depression, memory loss and substance abuse. PHYSICAL EXAM     Visit Vitals  BP (!) 136/93 (BP 1 Location: Left upper arm, BP Patient Position: Sitting, BP Cuff Size: Adult)   Pulse 92   Temp 98.5 °F (36.9 °C) (Oral)   Resp 16   Ht 5' 5\" (1.651 m)   Wt 190 lb 9.6 oz (86.5 kg)   SpO2 100%   BMI 31.72 kg/m²       Physical Exam  Vitals signs and nursing note reviewed. HENT:      Head: Normocephalic and atraumatic. Right Ear: External ear normal.      Left Ear: External ear normal.   Cardiovascular:      Rate and Rhythm: Normal rate and regular rhythm. Heart sounds: Normal heart sounds, S1 normal and S2 normal. No murmur. No friction rub. No gallop. Pulmonary:      Effort: Pulmonary effort is normal.      Breath sounds: Normal breath sounds. Musculoskeletal: Normal range of motion. Skin:     General: Skin is warm and dry. Neurological:      Mental Status: She is alert and oriented to person, place, and time. Gait: Gait is intact. Psychiatric:         Attention and Perception: She is inattentive. Mood and Affect: Affect normal.         Judgment: Judgment normal.       ASSESSMENT/ PLAN   Diagnoses and all orders for this visit:    1. Polydipsia  -     HEMOGLOBIN A1C WITH EAG; Future  -     METABOLIC PANEL, COMPREHENSIVE; Future    2. Family history of diabetes mellitus (DM)  -     HEMOGLOBIN A1C WITH EAG; Future  -     METABOLIC PANEL, COMPREHENSIVE; Future    3. Attention deficit hyperactivity disorder (ADHD), unspecified ADHD type  - Start   dextroamphetamine-amphetamine (ADDERALL) 10 mg tablet; Take 1 Tab by mouth daily.  Max Daily Amount: 10 mg.  -Will need to have records faxed to us to confirm ADHD diagnosis. Will provide a 1 month supply during today's visit. 4. Weight gain  -     TSH 3RD GENERATION; Future    Total time: 35 mins. Disclaimer:  Advised patient to call back or return to office if symptoms worsen/change/persist.  Discussed expected course/resolution/complications of diagnosis in detail with patient.     Medication risks/benefits/alternatives discussed with patient. Patient was given an after visit summary which includes diagnoses, current medications, & vitals.      Discussed patient instructions and advised to read to all patient instructions regarding care.      Patient expressed understanding with the diagnosis and plan. This note will not be viewable in [address]         Emir Campos NP  3/3/2021        (This document has been electronically signed)

## 2021-03-04 LAB
ALBUMIN SERPL-MCNC: 4.5 G/DL (ref 3.9–5)
ALBUMIN/GLOB SERPL: 1.9 {RATIO} (ref 1.2–2.2)
ALP SERPL-CCNC: 72 IU/L (ref 39–117)
ALT SERPL-CCNC: 39 IU/L (ref 0–32)
AST SERPL-CCNC: 32 IU/L (ref 0–40)
BILIRUB SERPL-MCNC: 0.4 MG/DL (ref 0–1.2)
BUN SERPL-MCNC: 13 MG/DL (ref 6–20)
BUN/CREAT SERPL: 17 (ref 9–23)
CALCIUM SERPL-MCNC: 9.7 MG/DL (ref 8.7–10.2)
CHLORIDE SERPL-SCNC: 105 MMOL/L (ref 96–106)
CO2 SERPL-SCNC: 21 MMOL/L (ref 20–29)
CREAT SERPL-MCNC: 0.75 MG/DL (ref 0.57–1)
EST. AVERAGE GLUCOSE BLD GHB EST-MCNC: 108 MG/DL
GLOBULIN SER CALC-MCNC: 2.4 G/DL (ref 1.5–4.5)
GLUCOSE SERPL-MCNC: 88 MG/DL (ref 65–99)
HBA1C MFR BLD: 5.4 % (ref 4.8–5.6)
POTASSIUM SERPL-SCNC: 4.3 MMOL/L (ref 3.5–5.2)
PROT SERPL-MCNC: 6.9 G/DL (ref 6–8.5)
SODIUM SERPL-SCNC: 139 MMOL/L (ref 134–144)

## 2021-03-04 NOTE — PROGRESS NOTES
Called [facility] and spoke with Kinza Coates, [facility] was ordered, however, [employer]  only processed two of the 3 orders Provider put in, she instructed me to send an Add on for the TSH, I got Provider to sign, and faxed over.

## 2021-03-05 ENCOUNTER — TELEPHONE (OUTPATIENT)
Dept: PRIMARY CARE CLINIC | Age: 31
End: 2021-03-05

## 2021-03-06 LAB
SPECIMEN STATUS REPORT, ROLRST: NORMAL
TSH SERPL DL<=0.005 MIU/L-ACNC: 1.86 UIU/ML (ref 0.45–4.5)

## 2021-03-09 ENCOUNTER — VIRTUAL VISIT (OUTPATIENT)
Dept: PRIMARY CARE CLINIC | Age: 31
End: 2021-03-09
Payer: COMMERCIAL

## 2021-03-09 DIAGNOSIS — K12.0 APHTHOUS ULCER: Primary | ICD-10-CM

## 2021-03-09 PROCEDURE — 99212 OFFICE O/P EST SF 10 MIN: CPT | Performed by: NURSE PRACTITIONER

## 2021-03-09 NOTE — PROGRESS NOTES
[facility]   [address] [facility], [address] [place] [address]  P: [phone]  F: [phone]    [address]ahan is a 35 y.o. female who is seen over telehealth for Mouth Lesions/aphthous ulcer x 1 day. No redness, swelling, or drainage. There are no active problems to display for this patient.      Past Medical History:   Diagnosis Date    Anxiety     Asthma     Ovarian cyst      Past Surgical History:   Procedure Laterality Date    HX CHOLECYSTECTOMY      HX CHOLECYSTECTOMY      2009     Social History     Socioeconomic History    Marital status: SINGLE     Spouse name: Not on file    Number of children: Not on file    Years of education: Not on file    Highest education level: Not on file   Occupational History    Not on file   Social Needs    Financial resource strain: Not on file    Food insecurity     Worry: Not on file     Inability: Not on file    Transportation needs     Medical: Not on file     Non-medical: Not on file   Tobacco Use    Smoking status: Never Smoker    Smokeless tobacco: Never Used   Substance and Sexual Activity    Alcohol use: Yes     Comment: socially    Drug use: Not Currently    Sexual activity: Yes     Partners: Male   Lifestyle    Physical activity     Days per week: Not on file     Minutes per session: Not on file    Stress: Not on file   Relationships    Social connections     Talks on phone: Not on file     Gets together: Not on file     Attends Jain service: Not on file     Active member of club or organization: Not on file     Attends meetings of clubs or organizations: Not on file     Relationship status: Not on file    Intimate partner violence     Fear of current or ex partner: Not on file     Emotionally abused: Not on file     Physically abused: Not on file     Forced sexual activity: Not on file   Other Topics Concern    Not on file   Social History Narrative    ** Merged History Encounter **          No family history on file. Allergies   Allergen Reactions    Latex Swelling    Dilaudid [Hydromorphone (Bulk)] Other (comments)     hallucinations    Dilaudid [Hydromorphone] Hives     And hallucinations.  Nystatin-Emollient Combo No.54 Rash       Current Outpatient Medications   Medication Sig Dispense Refill    dextroamphetamine-amphetamine (ADDERALL) 10 mg tablet Take 1 Tab by mouth daily. Max Daily Amount: 10 mg. 30 Tab 0    cyclobenzaprine (FLEXERIL) 10 mg tablet Take 1 Tab by mouth nightly. 15 Tab 0    fluticasone propionate (FLONASE) 50 mcg/actuation nasal spray 2 sprays each nostril daily. 1 Bottle 1    albuterol (PROVENTIL HFA, VENTOLIN HFA, PROAIR HFA) 90 mcg/actuation inhaler Take 1 Puff by inhalation every six (6) hours as needed for Wheezing. 1 Inhaler 3    albuterol (PROVENTIL VENTOLIN) 2.5 mg /3 mL (0.083 %) nebu 3 mL by Nebulization route two (2) times daily as needed for Wheezing. 24 Each 1           The medications were reviewed and updated in the medical record. The past medical history, past surgical history, and family history were reviewed and updated in the medical record. REVIEW OF SYSTEMS   Review of Systems   Constitutional: Negative for malaise/fatigue. HENT: Negative for congestion. Eyes: Negative for blurred vision and pain. Respiratory: Negative for cough and shortness of breath. Cardiovascular: Negative for chest pain and palpitations. Gastrointestinal: Negative for abdominal pain and heartburn. Genitourinary: Negative for frequency and urgency. Musculoskeletal: Negative for joint pain and myalgias. Neurological: Negative for dizziness, tingling, sensory change, weakness and headaches. Psychiatric/Behavioral: Negative for depression, memory loss and substance abuse. PHYSICAL EXAM   NO VITALS WERE TAKEN FOR THIS VISIT    Physical Exam  Constitutional:       Appearance: Normal appearance. HENT:      Head: Normocephalic and atraumatic.       Right Ear: External ear normal.      Left Ear: External ear normal.   Eyes:      Conjunctiva/sclera: Conjunctivae normal.   Pulmonary:      Effort: Pulmonary effort is normal.   Neurological:      Mental Status: She is alert and oriented to person, place, and time. Mental status is at baseline. Psychiatric:         Speech: Speech normal.         Behavior: Behavior normal. Behavior is cooperative. Thought Content: Thought content normal.       ASSESSMENT/ PLAN   Diagnoses and all orders for this visit:    1. Aphthous ulcer      -Discussion the area should resolve on its own, Orajel for pain relief. I was at home while conducting this encounter. Consent:  She and/or her healthcare decision maker is aware that this patient-initiated Telehealth encounter is a billable service, with coverage as determined by her insurance carrier. She is aware that she may receive a bill and has provided verbal consent to proceed: Yes    This virtual visit was conducted via [employer]. Pursuant to the emergency declaration under the [facility], [facility] waiver authority and the [employer] and [employer] Act, this Virtual  Visit was conducted to reduce the patient's risk of exposure to COVID-19 and provide continuity of care for an established patient. Services were provided through a video synchronous discussion virtually to substitute for in-person clinic visit. Due to this being a TeleHealth evaluation, many elements of the physical examination are unable to be assessed. Total Time: minutes: 5-10 minutes. Disclaimer:  Advised patient to call back or return to office if symptoms worsen/change/persist.  Discussed expected course/resolution/complications of diagnosis in detail with patient. Medication risks/benefits/alternatives discussed with patient. Patient was given an after visit summary which includes diagnoses, current medications, & vitals. Discussed patient instructions and advised to read to all patient instructions regarding care. Patient expressed understanding with the diagnosis and plan.            Lizbeth Sierra NP  3/9/2021        (This document has been electronically signed)

## 2021-03-29 ENCOUNTER — OFFICE VISIT (OUTPATIENT)
Dept: PRIMARY CARE CLINIC | Age: 31
End: 2021-03-29
Payer: COMMERCIAL

## 2021-03-29 VITALS
SYSTOLIC BLOOD PRESSURE: 139 MMHG | WEIGHT: 184.2 LBS | TEMPERATURE: 98 F | RESPIRATION RATE: 16 BRPM | HEIGHT: 65 IN | HEART RATE: 91 BPM | BODY MASS INDEX: 30.69 KG/M2 | OXYGEN SATURATION: 98 % | DIASTOLIC BLOOD PRESSURE: 94 MMHG

## 2021-03-29 DIAGNOSIS — J45.909 MODERATE ASTHMA WITHOUT COMPLICATION, UNSPECIFIED WHETHER PERSISTENT: ICD-10-CM

## 2021-03-29 DIAGNOSIS — R19.7 DIARRHEA, UNSPECIFIED TYPE: Primary | ICD-10-CM

## 2021-03-29 PROCEDURE — 99213 OFFICE O/P EST LOW 20 MIN: CPT | Performed by: NURSE PRACTITIONER

## 2021-03-29 RX ORDER — LEVONORGESTREL / ETHINYL ESTRADIOL AND ETHINYL ESTRADIOL 150-30(84)
KIT ORAL DAILY
COMMUNITY
End: 2022-09-13

## 2021-03-29 RX ORDER — ALBUTEROL SULFATE 90 UG/1
1 AEROSOL, METERED RESPIRATORY (INHALATION)
Qty: 1 INHALER | Refills: 3 | Status: SHIPPED | OUTPATIENT
Start: 2021-03-29 | End: 2022-09-13 | Stop reason: SDUPTHER

## 2021-03-29 RX ORDER — CIPROFLOXACIN 500 MG/1
500 TABLET ORAL 2 TIMES DAILY
Qty: 20 TAB | Refills: 0 | Status: SHIPPED | OUTPATIENT
Start: 2021-03-29 | End: 2021-04-08

## 2021-03-29 NOTE — PROGRESS NOTES
Chief Complaint   Patient presents with    Follow-up     needs inhaler refilled, patient wants to follow you. 1. Have you been to the ER, urgent care clinic since your last visit? Hospitalized since your last visit? No    2. Have you seen or consulted any other health care providers outside of the [address] [place] [address] since your last visit? Include any pap smears or colon screening.  No    Visit Vitals  BP (!) 139/94 (BP 1 Location: Left upper arm, BP Patient Position: Sitting, BP Cuff Size: Adult)   Pulse 91   Temp 98 °F (36.7 °C) (Temporal)   Resp 16   Ht 5' 5\" (1.651 m)   Wt 184 lb 3.2 oz (83.6 kg)   SpO2 98%   BMI 30.65 kg/m²

## 2021-03-29 NOTE — PROGRESS NOTES
[facility]   [address] [facility], [address] [place] [address]  P: [phone]  F: [phone]    [address]u is a 35 y.o. female who presents to clinic for Follow-up, requesting a refill of her albuterol inhaler which she uses as needed for asthma. Presents with acute complaint of 1 week of diarrhea, states she is going to having liquid BMs every time she eats something. Denies nausea or vomiting. 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 The patient denies fevers but reports some lower abdominal discomfort. Denies bloody or tarry stools. There are no active problems to display for this patient.      Past Medical History:   Diagnosis Date    Anxiety     Asthma     Ovarian cyst      Past Surgical History:   Procedure Laterality Date    HX CHOLECYSTECTOMY      HX CHOLECYSTECTOMY      2009     Social History     Socioeconomic History    Marital status: SINGLE     Spouse name: Not on file    Number of children: Not on file    Years of education: Not on file    Highest education level: Not on file   Occupational History    Not on file   Social Needs    Financial resource strain: Not on file    Food insecurity     Worry: Not on file     Inability: Not on file    Transportation needs     Medical: Not on file     Non-medical: Not on file   Tobacco Use    Smoking status: Never Smoker    Smokeless tobacco: Never Used   Substance and Sexual Activity    Alcohol use: Yes     Comment: socially    Drug use: Not Currently    Sexual activity: Yes     Partners: Male   Lifestyle    Physical activity     Days per week: Not on file     Minutes per session: Not on file    Stress: Not on file   Relationships    Social connections     Talks on phone: Not on file     Gets together: Not on file     Attends Latter day service: Not on file     Active member of club or organization: Not on file     Attends meetings of clubs or organizations: Not on file     Relationship status: Not on file    Intimate partner violence     Fear of current or ex partner: Not on file     Emotionally abused: Not on file     Physically abused: Not on file     Forced sexual activity: Not on file   Other Topics Concern    Not on file   Social History Narrative    ** Merged History Encounter **          History reviewed. No pertinent family history. Allergies   Allergen Reactions    Latex Swelling    Dilaudid [Hydromorphone (Bulk)] Other (comments)     hallucinations    Dilaudid [Hydromorphone] Hives     And hallucinations.  Nystatin-Emollient Combo No.54 Rash       Current Outpatient Medications   Medication Sig Dispense Refill    L-Norgest&E Estradiol-E Estrad (Seasonique) 0.15 mg-30 mcg (84)/10 mcg (7) 3MPk Take  by mouth daily.  albuterol (PROVENTIL HFA, VENTOLIN HFA, PROAIR HFA) 90 mcg/actuation inhaler Take 1 Puff by inhalation every six (6) hours as needed for Wheezing. 1 Inhaler 3    ciprofloxacin HCl (CIPRO) 500 mg tablet Take 1 Tab by mouth two (2) times a day for 10 days. 20 Tab 0    dextroamphetamine-amphetamine (ADDERALL) 10 mg tablet Take 1 Tab by mouth daily. Max Daily Amount: 10 mg. 30 Tab 0    albuterol (PROVENTIL VENTOLIN) 2.5 mg /3 mL (0.083 %) nebu 3 mL by Nebulization route two (2) times daily as needed for Wheezing. 24 Each 1    cyclobenzaprine (FLEXERIL) 10 mg tablet Take 1 Tab by mouth nightly. 15 Tab 0    fluticasone propionate (FLONASE) 50 mcg/actuation nasal spray 2 sprays each nostril daily. 1 Bottle 1           The medications were reviewed and updated in the medical record. The past medical history, past surgical history, and family history were reviewed and updated in the medical record. REVIEW OF SYSTEMS   Review of Systems   Constitutional: Negative for malaise/fatigue. HENT: Negative for congestion. Eyes: Negative for blurred vision and pain. Respiratory: Negative for cough and shortness of breath. Cardiovascular: Negative for chest pain and palpitations. Gastrointestinal: Positive for abdominal pain and diarrhea. Negative for heartburn. Genitourinary: Negative for frequency and urgency. Musculoskeletal: Negative for joint pain and myalgias. Neurological: Negative for dizziness, tingling, sensory change, weakness and headaches. Psychiatric/Behavioral: Negative for depression, memory loss and substance abuse. PHYSICAL EXAM     Visit Vitals  BP (!) 139/94 (BP 1 Location: Left upper arm, BP Patient Position: Sitting, BP Cuff Size: Adult)   Pulse 91   Temp 98 °F (36.7 °C) (Temporal)   Resp 16   Ht 5' 5\" (1.651 m)   Wt 184 lb 3.2 oz (83.6 kg)   SpO2 98%   BMI 30.65 kg/m²       Physical Exam  Vitals signs and nursing note reviewed. HENT:      Head: Normocephalic and atraumatic. Right Ear: External ear normal.      Left Ear: External ear normal.   Cardiovascular:      Rate and Rhythm: Normal rate and regular rhythm. Heart sounds: Normal heart sounds, S1 normal and S2 normal. No murmur. No friction rub. No gallop. Pulmonary:      Effort: Pulmonary effort is normal.      Breath sounds: Normal breath sounds. Abdominal:          Comments: Suprapubic/subumbilical abdominal discomfort. Musculoskeletal: Normal range of motion. Skin:     General: Skin is warm and dry. Neurological:      Mental Status: She is alert and oriented to person, place, and time. Gait: Gait is intact. Psychiatric:         Mood and Affect: Affect normal.         Judgment: Judgment normal.       ASSESSMENT/ PLAN   Diagnoses and all orders for this visit:    1. Diarrhea, unspecified type  -     ciprofloxacin HCl (CIPRO) 500 mg tablet; Take 1 Tab by mouth two (2) times a day for 10 days. -     CULTURE, STOOL  -     OVA & PARASITES, STOOL; Future  -Will check stool culture and ova parasite panel, start Cipro and probiotic. Increase hydration encouraged electrolyte replacement, BRAT diet.     2. Moderate asthma without complication, unspecified whether persistent  -     albuterol (PROVENTIL HFA, VENTOLIN HFA, PROAIR HFA) 90 mcg/actuation inhaler; Take 1 Puff by inhalation every six (6) hours as needed for Wheezing. Disclaimer:  Advised patient to call back or return to office if symptoms worsen/change/persist.  Discussed expected course/resolution/complications of diagnosis in detail with patient.     Medication risks/benefits/alternatives discussed with patient. Patient was given an after visit summary which includes diagnoses, current medications, & vitals.      Discussed patient instructions and advised to read to all patient instructions regarding care.      Patient expressed understanding with the diagnosis and plan. This note will not be viewable in [address]         Leah Turner NP  3/29/2021        (This document has been electronically signed)

## 2021-05-01 ENCOUNTER — HOSPITAL ENCOUNTER (EMERGENCY)
Age: 31
Discharge: HOME | End: 2021-05-01
Attending: STUDENT IN AN ORGANIZED HEALTH CARE EDUCATION/TRAINING PROGRAM
Payer: COMMERCIAL

## 2021-05-01 VITALS
OXYGEN SATURATION: 100 % | WEIGHT: 183.2 LBS | SYSTOLIC BLOOD PRESSURE: 136 MMHG | BODY MASS INDEX: 30.52 KG/M2 | HEART RATE: 91 BPM | HEIGHT: 65 IN | TEMPERATURE: 98 F | DIASTOLIC BLOOD PRESSURE: 88 MMHG

## 2021-05-01 DIAGNOSIS — M25.561 ARTHRALGIA OF RIGHT LOWER LEG: Primary | ICD-10-CM

## 2021-05-01 LAB
COMMENT, HOLDF: NORMAL
D DIMER PPP FEU-MCNC: 0.3 MG/L FEU (ref 0–0.65)
SAMPLES BEING HELD,HOLD: NORMAL

## 2021-05-01 PROCEDURE — 99283 EMERGENCY DEPT VISIT LOW MDM: CPT

## 2021-05-01 PROCEDURE — 85379 FIBRIN DEGRADATION QUANT: CPT

## 2021-05-01 PROCEDURE — 36415 COLL VENOUS BLD VENIPUNCTURE: CPT

## 2021-05-02 NOTE — ED NOTES
Dr. Celestino Casillas reviewed discharge instructions with the patient. The patient verbalized understanding. Patient ambulated out of the emergency department without assistance. Patient is in no apparent distress.

## 2021-05-02 NOTE — ED TRIAGE NOTES
Patient presents to the emergency department reporting left calf pain. Patient states she has had pain consistently since Thursday. Patient has no notable swelling. Patient is on oral birth control, but not a smoker.

## 2021-05-02 NOTE — ED PROVIDER NOTES
The history is provided by the patient. Leg Pain   This is a new problem. The current episode started 2 days ago. The problem occurs constantly. The problem has not changed since onset. The pain is present in the right lower leg. The quality of the pain is described as aching. The pain is mild. Associated symptoms include stiffness. Pertinent negatives include no numbness, full range of motion, no tingling and no back pain. The symptoms are aggravated by activity. The treatment provided no relief. There has been no history of extremity trauma. Past Medical History:   Diagnosis Date    Anxiety     Asthma     Ovarian cyst        Past Surgical History:   Procedure Laterality Date    HX CHOLECYSTECTOMY      HX CHOLECYSTECTOMY      2009         History reviewed. No pertinent family history.     Social History     Socioeconomic History    Marital status: SINGLE     Spouse name: Not on file    Number of children: Not on file    Years of education: Not on file    Highest education level: Not on file   Occupational History    Not on file   Social Needs    Financial resource strain: Not on file    Food insecurity     Worry: Not on file     Inability: Not on file    Transportation needs     Medical: Not on file     Non-medical: Not on file   Tobacco Use    Smoking status: Never Smoker    Smokeless tobacco: Never Used   Substance and Sexual Activity    Alcohol use: Yes     Comment: socially    Drug use: Not Currently    Sexual activity: Yes     Partners: Male   Lifestyle    Physical activity     Days per week: Not on file     Minutes per session: Not on file    Stress: Not on file   Relationships    Social connections     Talks on phone: Not on file     Gets together: Not on file     Attends Muslim service: Not on file     Active member of club or organization: Not on file     Attends meetings of clubs or organizations: Not on file     Relationship status: Not on file    Intimate partner violence Fear of current or ex partner: Not on file     Emotionally abused: Not on file     Physically abused: Not on file     Forced sexual activity: Not on file   Other Topics Concern    Not on file   Social History Narrative    ** Merged History Encounter **              ALLERGIES: Latex, Dilaudid [hydromorphone (bulk)], Dilaudid [hydromorphone], and Nystatin-emollient combo no. 54    Review of Systems   Constitutional: Negative for fever. Respiratory: Negative for cough and shortness of breath. Cardiovascular: Negative for chest pain. Gastrointestinal: Negative for abdominal pain and vomiting. Musculoskeletal: Positive for arthralgias (R lower leg, see HPI) and stiffness. Negative for back pain. Skin: Negative for color change and rash. Neurological: Negative for tingling, syncope, numbness and headaches. All other systems reviewed and are negative. Vitals:    05/01/21 2205   BP: (!) 148/96   Pulse: 91   Temp: 98 °F (36.7 °C)   SpO2: 99%   Weight: 83.1 kg (183 lb 3.2 oz)   Height: 5' 5\" (1.651 m)            Physical Exam  Vitals signs and nursing note reviewed. Constitutional:       General: She is not in acute distress. Appearance: She is well-developed. HENT:      Head: Normocephalic and atraumatic. Neck:      Musculoskeletal: Normal range of motion. Cardiovascular:      Rate and Rhythm: Normal rate and regular rhythm. Pulmonary:      Effort: Pulmonary effort is normal. No respiratory distress. Abdominal:      General: Abdomen is flat. There is no distension. Musculoskeletal: Normal range of motion. General: No swelling, tenderness, deformity or signs of injury. Right lower leg: No edema. Left lower leg: No edema. Skin:     General: Skin is warm and dry. Neurological:      Mental Status: She is alert and oriented to person, place, and time. Motor: No abnormal muscle tone.           MDM       Procedures

## 2022-06-28 ENCOUNTER — APPOINTMENT (OUTPATIENT)
Dept: CT IMAGING | Age: 32
End: 2022-06-28
Attending: EMERGENCY MEDICINE
Payer: COMMERCIAL

## 2022-06-28 ENCOUNTER — HOSPITAL ENCOUNTER (EMERGENCY)
Age: 32
Discharge: HOME | End: 2022-06-28
Attending: EMERGENCY MEDICINE
Payer: COMMERCIAL

## 2022-06-28 VITALS
HEART RATE: 92 BPM | WEIGHT: 191.8 LBS | DIASTOLIC BLOOD PRESSURE: 95 MMHG | HEIGHT: 66 IN | BODY MASS INDEX: 30.82 KG/M2 | SYSTOLIC BLOOD PRESSURE: 140 MMHG | OXYGEN SATURATION: 98 % | TEMPERATURE: 98.3 F

## 2022-06-28 DIAGNOSIS — S09.90XA CLOSED HEAD INJURY, INITIAL ENCOUNTER: Primary | ICD-10-CM

## 2022-06-28 LAB — HCG UR QL: NEGATIVE

## 2022-06-28 PROCEDURE — 81025 URINE PREGNANCY TEST: CPT

## 2022-06-28 PROCEDURE — 74011250637 HC RX REV CODE- 250/637: Performed by: EMERGENCY MEDICINE

## 2022-06-28 PROCEDURE — 99284 EMERGENCY DEPT VISIT MOD MDM: CPT

## 2022-06-28 PROCEDURE — 70450 CT HEAD/BRAIN W/O DYE: CPT

## 2022-06-28 PROCEDURE — 72125 CT NECK SPINE W/O DYE: CPT

## 2022-06-28 RX ORDER — ACETAMINOPHEN 500 MG
1000 TABLET ORAL ONCE
Status: COMPLETED | OUTPATIENT
Start: 2022-06-28 | End: 2022-06-28

## 2022-06-28 RX ADMIN — ACETAMINOPHEN 1000 MG: 500 TABLET ORAL at 22:01

## 2022-06-29 NOTE — ED PROVIDER NOTES
80-year-old female with history of anxiety presents with complaints of headache and neck pain after tree fell and struck her forehead this morning at approximately 10:30 AM.  Patient reports that she has her typical migraine headache. Denies vision changes, nausea, vomiting, diarrhea, constipation. Denies numbness, tingling, weakness. No pain medications taken prior to arrival.  Patient reports that she saw blood-tinged on tissue after she blew her nose this morning. Patient reports she is very anxious about the trauma this morning. Denies loss of consciousness. Denies blood thinner use. Denies pregnancy. No other complaints. Denies tobacco use, drug use, alcohol use  [facility]           Past Medical History:   Diagnosis Date    Anxiety     Asthma     Ovarian cyst        Past Surgical History:   Procedure Laterality Date    HX CHOLECYSTECTOMY      HX CHOLECYSTECTOMY      2009         History reviewed. No pertinent family history. Social History     Socioeconomic History    Marital status: SINGLE     Spouse name: Not on file    Number of children: Not on file    Years of education: Not on file    Highest education level: Not on file   Occupational History    Not on file   Tobacco Use    Smoking status: Never Smoker    Smokeless tobacco: Never Used   Vaping Use    Vaping Use: Never used   Substance and Sexual Activity    Alcohol use: Yes     Comment: socially    Drug use: Not Currently    Sexual activity: Yes     Partners: Male   Other Topics Concern    Not on file   Social History Narrative    ** Merged History Encounter **          Social Determinants of Health     Financial Resource Strain:     Difficulty of Paying Living Expenses: Not on file   Food Insecurity:     Worried About Running Out of Food in the Last Year: Not on file    Jesse of Food in the Last Year: Not on file   Transportation Needs:     Lack of Transportation (Medical):  Not on file    Lack of Transportation (Non-Medical): Not on file   Physical Activity:     Days of Exercise per Week: Not on file    Minutes of Exercise per Session: Not on file   Stress:     Feeling of Stress : Not on file   Social Connections:     Frequency of Communication with Friends and Family: Not on file    Frequency of Social Gatherings with Friends and Family: Not on file    Attends Gnosticism Services: Not on file    Active Member of [address] [place] [address] or Organizations: Not on file    Attends Club or Organization Meetings: Not on file    Marital Status: Not on file   Intimate Partner Violence:     Fear of Current or Ex-Partner: Not on file    Emotionally Abused: Not on file    Physically Abused: Not on file    Sexually Abused: Not on file   Housing Stability:     Unable to Pay for Housing in the Last Year: Not on file    Number of [place] in the Last Year: Not on file    Unstable Housing in the Last Year: Not on file         ALLERGIES: Latex, Dilaudid [hydromorphone (bulk)], Dilaudid [hydromorphone], and Nystatin-emollient combo no. 54    Review of Systems   Constitutional: Negative for chills and fever. Respiratory: Negative for cough and shortness of breath. Cardiovascular: Negative for chest pain. Gastrointestinal: Negative for abdominal pain, nausea and vomiting. Genitourinary: Negative for dysuria and urgency. Skin: Positive for wound. Neurological: Positive for headaches. Negative for dizziness, seizures, facial asymmetry, weakness and numbness. Vitals:    06/28/22 2141 06/28/22 2142   BP:  (!) 152/111   Pulse:  97   Temp: 98.3 °F (36.8 °C)    SpO2:  99%   Weight:  87 kg (191 lb 12.8 oz)   Height:  5' 6\" (1.676 m)            Physical Exam  Vitals and nursing note reviewed. Constitutional:       Appearance: She is well-developed. HENT:      Head: Normocephalic. Comments: Abrasion to forehead  Eyes:      Pupils: Pupils are equal, round, and reactive to light.    Neck:      Trachea: No tracheal deviation. Cardiovascular:      Rate and Rhythm: Normal rate and regular rhythm. Heart sounds: Normal heart sounds. Pulmonary:      Effort: Pulmonary effort is normal. No respiratory distress. Breath sounds: Normal breath sounds. No stridor. No wheezing or rales. Chest:      Chest wall: No tenderness. Abdominal:      General: Bowel sounds are normal. There is no distension. Palpations: Abdomen is soft. Tenderness: There is no abdominal tenderness. There is no rebound. Musculoskeletal:         General: No tenderness. Normal range of motion. Cervical back: Normal range of motion and neck supple. Skin:     General: Skin is warm and dry. Coloration: Skin is not pale. Findings: No rash. Neurological:      General: No focal deficit present. Mental Status: She is alert and oriented to person, place, and time. GCS: GCS eye subscore is 4. GCS verbal subscore is 5. GCS motor subscore is 6. Cranial Nerves: No cranial nerve deficit. Sensory: No sensory deficit. Motor: No weakness. Coordination: Coordination normal. Finger-Nose-Finger Test and Heel to [place] adams Test normal.      Gait: Gait is intact. MDM  Number of Diagnoses or Management Options  Diagnosis management comments: 61-year-old femaleHistory of anxiety and migraines presents with complaints of trauma to head this morning at 10:30 AM.  Patient is well-appearing, no acute distress, neurologically intact, hemodynamically stable, afebrile, nontoxic. Plan-head CT, C-spine CT for patient reassurance, Tylenol. Amount and/or Complexity of Data Reviewed  Tests in the radiology section of CPT®: ordered and reviewed  Independent visualization of images, tracings, or specimens: yes           Procedures      11:31 PM  Patient's results have been reviewed with them.   Patient and/or family have verbally conveyed their understanding and agreement of the patient's signs, symptoms, diagnosis, treatment and prognosis and additionally agree to follow up as recommended or return to the Emergency Room should their condition change prior to follow-up. Discharge instructions have also been provided to the patient with some educational information regarding their diagnosis as well a list of reasons why they would want to return to the ER prior to their follow-up appointment should their condition change.

## 2022-06-29 NOTE — ED TRIAGE NOTES
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 No LOC. Pt states headache and saw blood when she blew her nose.

## 2022-06-29 NOTE — ED NOTES
Discharge note: The patient was discharged home in stable condition. The patient is alert and oriented, is in no respiratory distress and has vital signs within normal limits. The patient's diagnosis, condition and treatment were explained to patient by Dr Gemma Melvin and reinforced by nurse. The patient and family party expressed understanding of discharge instructions and plan of care. A discharge plan has been developed. A  was not involved in the process. Patient offered a wheelchair to ED lobby for discharge but declined at this time. Patient ambulatory to ED lobby to go home.

## 2022-09-13 ENCOUNTER — OFFICE VISIT (OUTPATIENT)
Dept: FAMILY MEDICINE CLINIC | Age: 32
End: 2022-09-13
Payer: COMMERCIAL

## 2022-09-13 ENCOUNTER — APPOINTMENT (OUTPATIENT)
Dept: FAMILY MEDICINE CLINIC | Age: 32
End: 2022-09-13

## 2022-09-13 VITALS
RESPIRATION RATE: 16 BRPM | WEIGHT: 192 LBS | HEART RATE: 95 BPM | BODY MASS INDEX: 31.99 KG/M2 | OXYGEN SATURATION: 100 % | DIASTOLIC BLOOD PRESSURE: 88 MMHG | SYSTOLIC BLOOD PRESSURE: 132 MMHG | HEIGHT: 65 IN | TEMPERATURE: 98.3 F

## 2022-09-13 DIAGNOSIS — E66.09 CLASS 1 OBESITY DUE TO EXCESS CALORIES WITHOUT SERIOUS COMORBIDITY WITH BODY MASS INDEX (BMI) OF 31.0 TO 31.9 IN ADULT: ICD-10-CM

## 2022-09-13 DIAGNOSIS — J45.909 MODERATE ASTHMA WITHOUT COMPLICATION, UNSPECIFIED WHETHER PERSISTENT: ICD-10-CM

## 2022-09-13 DIAGNOSIS — R03.0 PREHYPERTENSION: ICD-10-CM

## 2022-09-13 DIAGNOSIS — R03.0 PREHYPERTENSION: Primary | ICD-10-CM

## 2022-09-13 DIAGNOSIS — J45.40 MODERATE PERSISTENT REACTIVE AIRWAY DISEASE WITHOUT COMPLICATION: ICD-10-CM

## 2022-09-13 PROCEDURE — 99203 OFFICE O/P NEW LOW 30 MIN: CPT | Performed by: FAMILY MEDICINE

## 2022-09-13 RX ORDER — MONTELUKAST SODIUM 10 MG/1
10 TABLET ORAL
COMMUNITY
Start: 2022-08-30 | End: 2022-09-13 | Stop reason: SDUPTHER

## 2022-09-13 RX ORDER — ALBUTEROL SULFATE 90 UG/1
1 AEROSOL, METERED RESPIRATORY (INHALATION)
Qty: 1 EACH | Refills: 3 | Status: SHIPPED | OUTPATIENT
Start: 2022-09-13

## 2022-09-13 RX ORDER — CETIRIZINE HCL 10 MG
10 TABLET ORAL
COMMUNITY
End: 2022-09-13 | Stop reason: SDUPTHER

## 2022-09-13 RX ORDER — MELOXICAM 10 MG/1
10 CAPSULE ORAL
COMMUNITY
End: 2022-09-13

## 2022-09-13 RX ORDER — ALBUTEROL SULFATE 0.83 MG/ML
2.5 SOLUTION RESPIRATORY (INHALATION)
Qty: 24 EACH | Refills: 1 | Status: SHIPPED | OUTPATIENT
Start: 2022-09-13

## 2022-09-13 RX ORDER — LEVONORGESTREL AND ETHINYL ESTRADIOL 0.15-0.03
1 KIT ORAL
COMMUNITY

## 2022-09-13 RX ORDER — CETIRIZINE HCL 10 MG
10 TABLET ORAL DAILY
Qty: 120 TABLET | Refills: 0 | Status: SHIPPED | OUTPATIENT
Start: 2022-09-13

## 2022-09-13 RX ORDER — MONTELUKAST SODIUM 10 MG/1
10 TABLET ORAL DAILY
Qty: 120 TABLET | Refills: 0 | Status: SHIPPED | OUTPATIENT
Start: 2022-09-13

## 2022-09-13 NOTE — PROGRESS NOTES
Identified pt with two pt identifiers(name and ). Chief Complaint   Patient presents with    [facility] Care     No concerns    Medication Refill        Health Maintenance Due   Topic    Hepatitis C Screening     DTaP/Tdap/Td series (1 - Tdap)    Cervical cancer screen     Depression Screen     Flu Vaccine (1)       Wt Readings from Last 3 Encounters:   22 192 lb (87.1 kg)   22 191 lb 12.8 oz (87 kg)   21 183 lb 3.2 oz (83.1 kg)     Temp Readings from Last 3 Encounters:   22 98.3 °F (36.8 °C) (Temporal)   22 98.3 °F (36.8 °C)   21 98 °F (36.7 °C)     BP Readings from Last 3 Encounters:   22 132/88   22 (!) 140/95   21 136/88     Pulse Readings from Last 3 Encounters:   22 95   22 92   21 91         Learning Assessment:  :     Learning Assessment 4/10/2019   PRIMARY LEARNER Patient   HIGHEST LEVEL OF EDUCATION - PRIMARY LEARNER  2 YEARS OF COLLEGE   BARRIERS PRIMARY LEARNER NONE   CO-LEARNER CAREGIVER No   PRIMARY LANGUAGE ENGLISH   LEARNER PREFERENCE PRIMARY DEMONSTRATION   ANSWERED BY patient   RELATIONSHIP SELF       Depression Screening:  :     3 most recent PHQ Screens 2022   PHQ Not Done -   Little interest or pleasure in doing things Not at all   Feeling down, depressed, irritable, or hopeless Not at all   Total Score PHQ 2 0       Fall Risk Assessment:  :     Fall Risk Assessment, last 12 mths 3/3/2021   Able to walk? Yes   Fall in past 12 months? 0   Do you feel unsteady? 0   Are you worried about falling 0       Abuse Screening:  :     Abuse Screening Questionnaire 2022 4/10/2019   Do you ever feel afraid of your partner? N N   Are you in a relationship with someone who physically or mentally threatens you? N N   Is it safe for you to go home? Y Y       Coordination of Care Questionnaire:  :     1) Have you been to an emergency room, urgent care clinic since your last visit?  yes [facility] visit 2 months ago for asthma Hospitalized since your last visit? no             2) Have you seen or consulted any other health care providers outside of [address] [place] [address] since your last visit? no  Previous PCP Raine Basilio (Include any pap smears or colon screenings in this section.)    3) Do you have an Advance Directive on file? no  Are you interested in receiving information about Advance Directives? no    Patient is accompanied by N/A I have received verbal consent from [facility] to discuss any/all medical information while they are present in the room. 4.  For patients aged 39-70: Has the patient had a colonoscopy / FIT/ Cologuard? NA - based on age      If the patient is female:    11. For patients aged 41-77: Has the patient had a mammogram within the past 2 years? NA - based on age or sex      10. For patients aged 21-65: Has the patient had a pap smear?  Yes - no Care Gap present

## 2022-09-13 NOTE — PROGRESS NOTES
Bryan Perry (: 1990) is a 33 y.o. female, new patient, here for evaluation of the following chief complaint(s):  Establish Care (No concerns) and Medication Refill       ASSESSMENT/PLAN:  Below is the assessment and plan developed based on review of pertinent history, physical exam, labs, studies, and medications. 1. Prehypertension  -     CBC WITH AUTOMATED DIFF; Future  -     LIPID PANEL; Future  2. Class 1 obesity due to excess calories without serious comorbidity with body mass index (BMI) of 31.0 to 31.9 in adult  -     LIPID PANEL; Future  -     METABOLIC PANEL, COMPREHENSIVE; Future      No follow-ups on file. SUBJECTIVE/OBJECTIVE:  Patient with a PMHx of asthma and reactive airway disease; weight gain; ADHD; situational anxiety presents to the clinic to establish care. States that she is mostly concerned with weight gain that has been ongoing for the past year. States that she would like to get down to her original weight of 145 lbs, but would like to start of with losing some weight to bring her to 170 lbs. Patient states that she is able to exercise, bringing her heart rate up to 140-150s while she is using her bike. She states that she is also concerned with the fall and spring months, because she has her asthma and reactive airway flares during these months. Review of Systems   Constitutional:  Negative for activity change, appetite change, chills, diaphoresis, fatigue, fever and unexpected weight change. HENT:  Negative for congestion, postnasal drip, rhinorrhea, sneezing, sore throat and voice change. Eyes: Negative. Respiratory:  Negative for apnea, cough, chest tightness, shortness of breath and wheezing. Cardiovascular: Negative. Gastrointestinal:  Positive for abdominal distention. Negative for abdominal pain, anal bleeding, blood in stool, constipation, diarrhea, nausea, rectal pain and vomiting. Endocrine: Negative.     Genitourinary:  Positive for pelvic pain. Negative for menstrual problem. Musculoskeletal: Negative. Skin: Negative. Allergic/Immunologic: Positive for environmental allergies. Negative for immunocompromised state. Neurological: Negative. Hematological: Negative. Physical Exam  Constitutional:       General: She is not in acute distress. Appearance: Normal appearance. She is obese. She is not ill-appearing, toxic-appearing or diaphoretic. HENT:      Head: Normocephalic and atraumatic. Right Ear: External ear normal.      Left Ear: External ear normal.      Nose: Nose normal.      Mouth/Throat:      Mouth: Mucous membranes are moist.      Pharynx: Oropharynx is clear. Eyes:      Extraocular Movements: Extraocular movements intact. Conjunctiva/sclera: Conjunctivae normal.      Pupils: Pupils are equal, round, and reactive to light. Cardiovascular:      Rate and Rhythm: Normal rate and regular rhythm. Pulses: Normal pulses. Heart sounds: Normal heart sounds. Pulmonary:      Effort: Pulmonary effort is normal.      Breath sounds: Normal breath sounds. Abdominal:      General: Abdomen is flat. Bowel sounds are normal.      Palpations: Abdomen is soft. Musculoskeletal:         General: Normal range of motion. Cervical back: Normal range of motion and neck supple. Skin:     General: Skin is warm. Capillary Refill: Capillary refill takes less than 2 seconds. Neurological:      General: No focal deficit present. Mental Status: She is alert and oriented to person, place, and time. Mental status is at baseline. Psychiatric:         Mood and Affect: Mood normal.         Behavior: Behavior normal.         Thought Content:  Thought content normal.         Judgment: Judgment normal.       On this date 09/13/2022 I have spent 20 minutes reviewing previous notes, test results and face to face with the patient discussing the diagnosis and importance of compliance with the treatment plan as well as documenting on the day of the visit. An electronic signature was used to authenticate this note.   -- Cesar Kennedy MD     Future Appointments   Date Time Provider Devang Baker   9/13/2022 10:15 AM LAB_[facility]

## 2022-09-15 LAB
ALBUMIN SERPL-MCNC: 4.5 G/DL (ref 3.8–4.8)
ALBUMIN/GLOB SERPL: 1.7 {RATIO} (ref 1.2–2.2)
ALP SERPL-CCNC: 72 IU/L (ref 44–121)
ALT SERPL-CCNC: 32 IU/L (ref 0–32)
AST SERPL-CCNC: 31 IU/L (ref 0–40)
BASOPHILS # BLD AUTO: 0 X10E3/UL (ref 0–0.2)
BASOPHILS NFR BLD AUTO: 1 %
BILIRUB SERPL-MCNC: 0.4 MG/DL (ref 0–1.2)
BUN SERPL-MCNC: 11 MG/DL (ref 6–20)
BUN/CREAT SERPL: 13 (ref 9–23)
CALCIUM SERPL-MCNC: 9.8 MG/DL (ref 8.7–10.2)
CHLORIDE SERPL-SCNC: 101 MMOL/L (ref 96–106)
CHOLEST SERPL-MCNC: 160 MG/DL (ref 100–199)
CO2 SERPL-SCNC: 19 MMOL/L (ref 20–29)
CREAT SERPL-MCNC: 0.82 MG/DL (ref 0.57–1)
EGFR: 98 ML/MIN/1.73
EOSINOPHIL # BLD AUTO: 0.1 X10E3/UL (ref 0–0.4)
EOSINOPHIL NFR BLD AUTO: 1 %
ERYTHROCYTE [DISTWIDTH] IN BLOOD BY AUTOMATED COUNT: 12.5 % (ref 11.7–15.4)
GLOBULIN SER CALC-MCNC: 2.7 G/DL (ref 1.5–4.5)
GLUCOSE SERPL-MCNC: 81 MG/DL (ref 65–99)
HCT VFR BLD AUTO: 44.9 % (ref 34–46.6)
HDLC SERPL-MCNC: 39 MG/DL
HGB BLD-MCNC: 14.6 G/DL (ref 11.1–15.9)
IMM GRANULOCYTES # BLD AUTO: 0 X10E3/UL (ref 0–0.1)
IMM GRANULOCYTES NFR BLD AUTO: 0 %
IMP & REVIEW OF LAB RESULTS: NORMAL
LDLC SERPL CALC-MCNC: 89 MG/DL (ref 0–99)
LYMPHOCYTES # BLD AUTO: 1.7 X10E3/UL (ref 0.7–3.1)
LYMPHOCYTES NFR BLD AUTO: 21 %
MCH RBC QN AUTO: 29.7 PG (ref 26.6–33)
MCHC RBC AUTO-ENTMCNC: 32.5 G/DL (ref 31.5–35.7)
MCV RBC AUTO: 91 FL (ref 79–97)
MONOCYTES # BLD AUTO: 0.4 X10E3/UL (ref 0.1–0.9)
MONOCYTES NFR BLD AUTO: 4 %
NEUTROPHILS # BLD AUTO: 6.1 X10E3/UL (ref 1.4–7)
NEUTROPHILS NFR BLD AUTO: 73 %
PLATELET # BLD AUTO: 232 X10E3/UL (ref 150–450)
POTASSIUM SERPL-SCNC: 5 MMOL/L (ref 3.5–5.2)
PROT SERPL-MCNC: 7.2 G/DL (ref 6–8.5)
RBC # BLD AUTO: 4.92 X10E6/UL (ref 3.77–5.28)
SODIUM SERPL-SCNC: 139 MMOL/L (ref 134–144)
TRIGL SERPL-MCNC: 186 MG/DL (ref 0–149)
VLDLC SERPL CALC-MCNC: 32 MG/DL (ref 5–40)
WBC # BLD AUTO: 8.4 X10E3/UL (ref 3.4–10.8)

## 2022-09-18 NOTE — PROGRESS NOTES
Patients labs are abnormal, her HDL is low and her triglycerides are elevated, indicating she needs to follow a diet that is high in good cholesterol, like olive oil, a mediterranean diet, and may start fish oil supplements. Patient should avoid alcohol other than a marginal amount of wine, not more than 30-60 ounces.

## 2022-09-21 ENCOUNTER — TELEPHONE (OUTPATIENT)
Dept: FAMILY MEDICINE CLINIC | Age: 32
End: 2022-09-21

## 2022-09-21 NOTE — TELEPHONE ENCOUNTER
Patient called and stated her adderall was not with the rest of her medications. I also do not see a UDS, does patient need to come in for UDS?

## 2022-09-23 ENCOUNTER — OFFICE VISIT (OUTPATIENT)
Dept: FAMILY MEDICINE CLINIC | Age: 32
End: 2022-09-23
Payer: COMMERCIAL

## 2022-09-23 VITALS
OXYGEN SATURATION: 97 % | HEIGHT: 65 IN | WEIGHT: 188 LBS | HEART RATE: 96 BPM | TEMPERATURE: 97.8 F | RESPIRATION RATE: 16 BRPM | BODY MASS INDEX: 31.32 KG/M2 | DIASTOLIC BLOOD PRESSURE: 94 MMHG | SYSTOLIC BLOOD PRESSURE: 146 MMHG

## 2022-09-23 DIAGNOSIS — F90.0 ATTENTION DEFICIT HYPERACTIVITY DISORDER (ADHD), PREDOMINANTLY INATTENTIVE TYPE: ICD-10-CM

## 2022-09-23 DIAGNOSIS — F90.0 ATTENTION DEFICIT HYPERACTIVITY DISORDER (ADHD), PREDOMINANTLY INATTENTIVE TYPE: Primary | ICD-10-CM

## 2022-09-23 PROCEDURE — 99213 OFFICE O/P EST LOW 20 MIN: CPT | Performed by: FAMILY MEDICINE

## 2022-09-23 NOTE — PROGRESS NOTES
Identified pt with two pt identifiers(name and ). Chief Complaint   Patient presents with    Behavioral Problem     Adderall        Health Maintenance Due   Topic    Hepatitis C Screening     DTaP/Tdap/Td series (1 - Tdap)    Cervical cancer screen     Flu Vaccine (1)       Wt Readings from Last 3 Encounters:   22 188 lb (85.3 kg)   22 192 lb (87.1 kg)   22 191 lb 12.8 oz (87 kg)     Temp Readings from Last 3 Encounters:   22 97.8 °F (36.6 °C) (Temporal)   22 98.3 °F (36.8 °C) (Temporal)   22 98.3 °F (36.8 °C)     BP Readings from Last 3 Encounters:   22 (!) 146/94   22 132/88   22 (!) 140/95     Pulse Readings from Last 3 Encounters:   22 96   22 95   22 92         Learning Assessment:  :     Learning Assessment 4/10/2019   PRIMARY LEARNER Patient   HIGHEST LEVEL OF EDUCATION - PRIMARY LEARNER  2 YEARS OF COLLEGE   BARRIERS PRIMARY LEARNER NONE   CO-LEARNER CAREGIVER No   PRIMARY LANGUAGE ENGLISH   LEARNER PREFERENCE PRIMARY DEMONSTRATION   ANSWERED BY patient   RELATIONSHIP SELF       Depression Screening:  :     3 most recent PHQ Screens 2022   PHQ Not Done -   Little interest or pleasure in doing things Not at all   Feeling down, depressed, irritable, or hopeless Not at all   Total Score PHQ 2 0       Fall Risk Assessment:  :     Fall Risk Assessment, last 12 mths 3/3/2021   Able to walk? Yes   Fall in past 12 months? 0   Do you feel unsteady? 0   Are you worried about falling 0       Abuse Screening:  :     Abuse Screening Questionnaire 2022 2022 4/10/2019   Do you ever feel afraid of your partner? N N N   Are you in a relationship with someone who physically or mentally threatens you? N N N   Is it safe for you to go home?  Y Y Y       Coordination of Care Questionnaire:  :     1) Have you been to an emergency room, urgent care clinic since your last visit? no   Hospitalized since your last visit? no             2) Have you seen or consulted any other health care providers outside of [address] [place] [address] since your last visit? no  (Include any pap smears or colon screenings in this section.)    3) Do you have an Advance Directive on file? no  Are you interested in receiving information about Advance Directives? no    Patient is accompanied by N/A I have received verbal consent from [facility] to discuss any/all medical information while they are present in the room. 4.  For patients aged 39-70: Has the patient had a colonoscopy / FIT/ Cologuard? NA - based on age      If the patient is female:    11. For patients aged 41-77: Has the patient had a mammogram within the past 2 years? NA - based on age or sex      10. For patients aged 21-65: Has the patient had a pap smear? Yes - Care Gap present.  Rooming MA/LPN to request most recent results

## 2022-09-26 NOTE — PROGRESS NOTES
Yuli Root (: 1990) is a 33 y.o. female, established patient, here for evaluation of the following chief complaint(s):  Behavioral Problem (Adderall)       ASSESSMENT/PLAN:  Below is the assessment and plan developed based on review of pertinent history, physical exam, labs, studies, and medications. 1. Attention deficit hyperactivity disorder (ADHD), predominantly inattentive type  -     AMPHETAMINES CONFIRM, URINE; Future      No follow-ups on file. SUBJECTIVE/OBJECTIVE:  Yuli Root is a 33 y.o. female here for adderall refill. States that she has been doing well, without complaints. Didn't realize that her blood pressure and heart rate have been elevated. She states that she has been drinking enough water, has 1-2 cups of coffee. Understands that the use of adderall can increase a persons blood pressure and heart rate and understands that she has to keep a record of both her blood pressure and heart rate before we can provide any more refills of the medication. Review of Systems   All other systems reviewed and are negative. Physical Exam  Constitutional:       Appearance: Normal appearance. HENT:      Head: Normocephalic and atraumatic. Right Ear: External ear normal.      Left Ear: External ear normal.      Nose: Nose normal. No congestion or rhinorrhea. Mouth/Throat:      Mouth: Mucous membranes are moist.   Eyes:      Extraocular Movements: Extraocular movements intact. Conjunctiva/sclera: Conjunctivae normal.      Pupils: Pupils are equal, round, and reactive to light. Cardiovascular:      Rate and Rhythm: Tachycardia present. Pulses: Normal pulses. Heart sounds: Normal heart sounds. Pulmonary:      Effort: Pulmonary effort is normal.   Musculoskeletal:      Cervical back: Normal range of motion. Neurological:      General: No focal deficit present. Mental Status: She is alert and oriented to person, place, and time.  Mental status is at baseline. Psychiatric:         Mood and Affect: Mood normal.         Behavior: Behavior normal.         Thought Content: Thought content normal.         Judgment: Judgment normal.       On this date 09/23/2022 I have spent 20 minutes reviewing previous notes, test results and face to face with the patient discussing the diagnosis and importance of compliance with the treatment plan as well as documenting on the day of the visit. An electronic signature was used to authenticate this note.   -- Yasmeen Strickland MD

## 2022-09-29 LAB
AMPHET CTO UR CFM-MCNC: >3000 NG/ML
AMPHET+METHAMPHET UR QL: POSITIVE
AMPHETAMINES UR QL: POSITIVE
METHAMPHET UR QL: NEGATIVE

## 2022-12-12 DIAGNOSIS — J45.40 MODERATE PERSISTENT REACTIVE AIRWAY DISEASE WITHOUT COMPLICATION: ICD-10-CM

## 2022-12-12 DIAGNOSIS — J45.909 MODERATE ASTHMA WITHOUT COMPLICATION, UNSPECIFIED WHETHER PERSISTENT: ICD-10-CM

## 2022-12-17 RX ORDER — CETIRIZINE HYDROCHLORIDE 10 MG/1
TABLET ORAL
Qty: 90 TABLET | Refills: 1 | Status: SHIPPED | OUTPATIENT
Start: 2022-12-17

## 2022-12-25 DIAGNOSIS — J45.40 MODERATE PERSISTENT REACTIVE AIRWAY DISEASE WITHOUT COMPLICATION: ICD-10-CM

## 2022-12-25 DIAGNOSIS — J45.909 MODERATE ASTHMA WITHOUT COMPLICATION, UNSPECIFIED WHETHER PERSISTENT: ICD-10-CM

## 2022-12-28 RX ORDER — MONTELUKAST SODIUM 10 MG/1
TABLET ORAL
Qty: 90 TABLET | Refills: 0 | Status: SHIPPED | OUTPATIENT
Start: 2022-12-28

## 2023-03-30 ENCOUNTER — TELEPHONE (OUTPATIENT)
Dept: FAMILY MEDICINE CLINIC | Age: 33
End: 2023-03-30

## 2023-03-30 NOTE — TELEPHONE ENCOUNTER
----- Message from [facility] [place] sent at 3/30/2023  8:31 AM EDT -----  Subject: Message to Provider    QUESTIONS  Information for Provider? Pt called today to get an appointment for foot   pain. I was able to get her an appointment for April 12th but she was   hoping to get in sooner if possible. The pain in her foot is making it   hard for her to walk. You can reach out to the Pt at [phone].   ---------------------------------------------------------------------------  --------------  Jaime Wilkins INFO  [phone]; OK to leave message on voicemail  ---------------------------------------------------------------------------  --------------  SCRIPT ANSWERS  Relationship to Patient?  Self

## 2023-04-01 DIAGNOSIS — J45.40 MODERATE PERSISTENT REACTIVE AIRWAY DISEASE WITHOUT COMPLICATION: ICD-10-CM

## 2023-04-01 DIAGNOSIS — J45.909 MODERATE ASTHMA WITHOUT COMPLICATION, UNSPECIFIED WHETHER PERSISTENT: ICD-10-CM

## 2023-04-03 RX ORDER — MONTELUKAST SODIUM 10 MG/1
TABLET ORAL
Qty: 90 TABLET | Refills: 0 | Status: SHIPPED | OUTPATIENT
Start: 2023-04-03

## 2023-07-18 DIAGNOSIS — J45.909 UNSPECIFIED ASTHMA, UNCOMPLICATED: ICD-10-CM

## 2023-07-18 DIAGNOSIS — J45.40 MODERATE PERSISTENT ASTHMA, UNCOMPLICATED: ICD-10-CM

## 2023-07-19 RX ORDER — MONTELUKAST SODIUM 10 MG/1
TABLET ORAL
Qty: 30 TABLET | Refills: 2 | Status: SHIPPED | OUTPATIENT
Start: 2023-07-19

## 2023-11-14 DIAGNOSIS — J45.40 MODERATE PERSISTENT ASTHMA, UNCOMPLICATED: ICD-10-CM

## 2023-11-14 DIAGNOSIS — J45.909 UNSPECIFIED ASTHMA, UNCOMPLICATED: ICD-10-CM

## 2023-11-14 RX ORDER — MONTELUKAST SODIUM 10 MG/1
TABLET ORAL
Qty: 30 TABLET | Refills: 0 | Status: SHIPPED | OUTPATIENT
Start: 2023-11-14

## 2023-12-13 DIAGNOSIS — J45.909 UNSPECIFIED ASTHMA, UNCOMPLICATED: ICD-10-CM

## 2023-12-13 DIAGNOSIS — J45.40 MODERATE PERSISTENT ASTHMA, UNCOMPLICATED: ICD-10-CM

## 2023-12-13 RX ORDER — MONTELUKAST SODIUM 10 MG/1
10 TABLET ORAL DAILY
Qty: 30 TABLET | Refills: 0 | Status: SHIPPED | OUTPATIENT
Start: 2023-12-13

## 2024-01-18 DIAGNOSIS — J45.909 UNSPECIFIED ASTHMA, UNCOMPLICATED: ICD-10-CM

## 2024-01-18 DIAGNOSIS — J45.40 MODERATE PERSISTENT ASTHMA, UNCOMPLICATED: ICD-10-CM

## 2024-01-18 NOTE — TELEPHONE ENCOUNTER
montelukast (SINGULAIR) 10 MG tablet     [facility] [id number] [facility] - [place], [place] - [address] [place] [address] DR - P [phone] - F [phone]

## 2024-01-19 RX ORDER — MONTELUKAST SODIUM 10 MG/1
10 TABLET ORAL DAILY
Qty: 30 TABLET | Refills: 0 | Status: SHIPPED | OUTPATIENT
Start: 2024-01-19

## 2024-02-20 DIAGNOSIS — J45.40 MODERATE PERSISTENT ASTHMA, UNCOMPLICATED: ICD-10-CM

## 2024-02-20 DIAGNOSIS — J45.909 UNSPECIFIED ASTHMA, UNCOMPLICATED: ICD-10-CM

## 2024-02-20 RX ORDER — MONTELUKAST SODIUM 10 MG/1
10 TABLET ORAL DAILY
Qty: 30 TABLET | Refills: 0 | Status: SHIPPED | OUTPATIENT
Start: 2024-02-20

## 2024-03-15 ENCOUNTER — TELEPHONE (OUTPATIENT)
Age: 34
End: 2024-03-15

## 2024-03-15 DIAGNOSIS — J45.909 UNSPECIFIED ASTHMA, UNCOMPLICATED: ICD-10-CM

## 2024-03-15 DIAGNOSIS — J45.40 MODERATE PERSISTENT ASTHMA, UNCOMPLICATED: ICD-10-CM

## 2024-03-15 RX ORDER — MONTELUKAST SODIUM 10 MG/1
10 TABLET ORAL DAILY
Qty: 30 TABLET | Refills: 0 | Status: CANCELLED | OUTPATIENT
Start: 2024-03-15

## 2024-03-15 NOTE — TELEPHONE ENCOUNTER
Medication Refill Request    Nancy Toro is requesting a refill of the following medication(s):    Disp Refills Start End    montelukast (SINGULAIR) 10 MG tablet 30 tablet 0 2/20/2024 --    Sig - Route: TAKE 1 TABLET BY MOUTH EVERY DAY - Oral      Please send refill to:     [id number] - [place] - [address] [place] [phone] - F [phone]  [address] [place] [address]  Phone: [phone] Fax: [phone]

## 2024-03-17 DIAGNOSIS — J45.909 UNSPECIFIED ASTHMA, UNCOMPLICATED: ICD-10-CM

## 2024-03-17 DIAGNOSIS — J45.40 MODERATE PERSISTENT ASTHMA, UNCOMPLICATED: ICD-10-CM

## 2024-03-18 RX ORDER — MONTELUKAST SODIUM 10 MG/1
10 TABLET ORAL DAILY
Qty: 30 TABLET | Refills: 0 | Status: SHIPPED | OUTPATIENT
Start: 2024-03-18

## 2024-04-21 DIAGNOSIS — J45.909 UNSPECIFIED ASTHMA, UNCOMPLICATED: ICD-10-CM

## 2024-04-21 DIAGNOSIS — J45.40 MODERATE PERSISTENT ASTHMA, UNCOMPLICATED: ICD-10-CM

## 2024-04-21 RX ORDER — MONTELUKAST SODIUM 10 MG/1
10 TABLET ORAL DAILY
Qty: 90 TABLET | Refills: 1 | Status: SHIPPED | OUTPATIENT
Start: 2024-04-21

## 2024-05-13 ENCOUNTER — TELEPHONE (OUTPATIENT)
Age: 34
End: 2024-05-13

## 2025-01-12 ENCOUNTER — HOSPITAL ENCOUNTER (EMERGENCY)
Facility: HOSPITAL | Age: 35
Discharge: HOME | End: 2025-01-12
Attending: STUDENT IN AN ORGANIZED HEALTH CARE EDUCATION/TRAINING PROGRAM
Payer: COMMERCIAL

## 2025-01-12 VITALS
DIASTOLIC BLOOD PRESSURE: 73 MMHG | HEIGHT: 65 IN | OXYGEN SATURATION: 95 % | BODY MASS INDEX: 30.82 KG/M2 | WEIGHT: 185 LBS | RESPIRATION RATE: 18 BRPM | HEART RATE: 108 BPM | TEMPERATURE: 98.3 F | SYSTOLIC BLOOD PRESSURE: 115 MMHG

## 2025-01-12 DIAGNOSIS — T78.2XXA ANAPHYLAXIS, INITIAL ENCOUNTER: Primary | ICD-10-CM

## 2025-01-12 PROCEDURE — 6360000002 HC RX W HCPCS: Performed by: STUDENT IN AN ORGANIZED HEALTH CARE EDUCATION/TRAINING PROGRAM

## 2025-01-12 PROCEDURE — 96374 THER/PROPH/DIAG INJ IV PUSH: CPT

## 2025-01-12 PROCEDURE — 99284 EMERGENCY DEPT VISIT MOD MDM: CPT

## 2025-01-12 RX ORDER — PREDNISONE 50 MG/1
50 TABLET ORAL DAILY PRN
Qty: 3 TABLET | Refills: 0 | Status: SHIPPED | OUTPATIENT
Start: 2025-01-12 | End: 2025-01-15

## 2025-01-12 RX ORDER — DIPHENHYDRAMINE HYDROCHLORIDE 50 MG/ML
25 INJECTION INTRAMUSCULAR; INTRAVENOUS EVERY 6 HOURS PRN
COMMUNITY

## 2025-01-12 RX ORDER — EPINEPHRINE 0.3 MG/.3ML
0.3 INJECTION SUBCUTANEOUS
Qty: 2 EACH | Refills: 3 | Status: SHIPPED | OUTPATIENT
Start: 2025-01-12 | End: 2025-01-12

## 2025-01-12 RX ORDER — FAMOTIDINE 20 MG/1
20 TABLET, FILM COATED ORAL 2 TIMES DAILY
COMMUNITY

## 2025-01-12 RX ORDER — DEXAMETHASONE SODIUM PHOSPHATE 10 MG/ML
10 INJECTION, SOLUTION INTRAMUSCULAR; INTRAVENOUS ONCE
Status: COMPLETED | OUTPATIENT
Start: 2025-01-12 | End: 2025-01-12

## 2025-01-12 RX ADMIN — DEXAMETHASONE SODIUM PHOSPHATE 10 MG: 10 INJECTION, SOLUTION INTRAMUSCULAR; INTRAVENOUS at 15:47

## 2025-01-12 ASSESSMENT — PAIN - FUNCTIONAL ASSESSMENT: PAIN_FUNCTIONAL_ASSESSMENT: NONE - DENIES PAIN

## 2025-01-12 NOTE — ED PROVIDER NOTES
mg IntraVENous Given 1/12/25 1547)       CONSULTS:  None         Medical Decision Making  Patient with symptoms consistent with anaphylaxis based on history, now resolved. Differential diagnosis includes allergic reaction, idiopathic angioedema, and mast cell activation syndrome, but anaphylaxis is the most likely diagnosis. Continue prednisone as needed and diphenhydramine for 24 hours, then as needed for symptom control. Prescribe an epinephrine autoinjector for home use with clear instructions on its administration in case of recurrence. Advise strict avoidance of the suspected allergen and provide follow-up with the primary care physician for ongoing management and allergen identification. Return precautions include persistent or recurrent symptoms such as difficulty breathing, throat swelling, chest tightness, or rash. Instruct patient to immediately call emergency medical services if epinephrine is required or symptoms escalate.    Risk  Prescription drug management.              IMPRESSION:  1. Anaphylaxis, initial encounter           DISPOSITION: Decision To Discharge 01/12/2025 04:26:47 PM      PATIENT REFERRED TO:  Ghazala Schneider MD  [address] [place], [place] [id number] [phone]    Schedule an appointment as soon as possible for a visit         DISCHARGE MEDICATIONS:  Discharge Medication List as of 1/12/2025  4:36 PM        START taking these medications    Details   EPINEPHrine (EPIPEN 2-THERESA) 0.3 MG/0.3ML SOAJ injection Inject 0.3 mLs into the muscle once as needed (anaphylaxis) Use as directed for allergic reaction, Disp-2 each, R-3Normal             Alexandre Jackson MD      6:45 PM       Total critical care time (not including time spent performing separately reportable procedures): 37      Alexandre Jackson MD      Please note that this dictation was completed with [employer], the [employer] voice recognition software.  Quite often unanticipated grammatical, syntax, homophones, and other

## 2025-01-12 NOTE — ED TRIAGE NOTES
Pt arrived via EMS, EMS states around 2:15 pt was cooking with \"rx sugar\" and experienced allergic reaction. Pt call 911 for trouble breathing, EMS reports scratchy throat, chest tightness, gave EPI at 1440, Benadryl @ 1430.    Pt states she tried a new plant based sugar and \" stopped breathing\". Pt states she could not talk or breath. Pt does not have EPI pen at home. 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  Pt reports itchy face, face swelling, chest tightness at home after consuming \"rx sugar\"    On arrival pt reports feeling better.